# Patient Record
Sex: MALE | Race: WHITE | NOT HISPANIC OR LATINO | Employment: FULL TIME | URBAN - METROPOLITAN AREA
[De-identification: names, ages, dates, MRNs, and addresses within clinical notes are randomized per-mention and may not be internally consistent; named-entity substitution may affect disease eponyms.]

---

## 2018-04-05 ENCOUNTER — TRANSFERRED RECORDS (OUTPATIENT)
Dept: HEALTH INFORMATION MANAGEMENT | Facility: CLINIC | Age: 53
End: 2018-04-05

## 2018-05-18 ENCOUNTER — TRANSFERRED RECORDS (OUTPATIENT)
Dept: HEALTH INFORMATION MANAGEMENT | Facility: CLINIC | Age: 53
End: 2018-05-18

## 2018-05-23 ENCOUNTER — TELEPHONE (OUTPATIENT)
Dept: GASTROENTEROLOGY | Facility: CLINIC | Age: 53
End: 2018-05-23

## 2018-05-23 DIAGNOSIS — K22.70 BARRETT'S ESOPHAGUS: Primary | ICD-10-CM

## 2018-05-23 NOTE — TELEPHONE ENCOUNTER
[facility] Intake form:    Referring/Requesting provider and Health care System: Dr. Deondre Dodd, [facility]      Clinic contact - Name, Phone and Fax number: Merlene DIAMOND @ [phone], ext [phone]    Requested provider (if specified): Dr. Avery    Has patient been evaluated in clinic or had a procedure [facility] provider in the last 5 years: No      Indication/Diagnosis for consultation: Dobson's w/Dysplasia and EGD with RFA    Is diagnosis on list of approved diagnosis: Yes     Has patient been evaluated by another Gastroenterologist? Yes, if yes, who? Dr. Dodd  Imaging completed:     CT scan     No   MRI         No      Procedures:     Upper Endoscopy/EGD   Yes - 5/18/18       Endoscopic Ultrasound/EUS No    ERCP      No    Colonoscopy    No      Are images able/being pushed to our system?Yes        Is patient aware of request for clinc consultation and ok to be contacted to schedule?Yes      Inform referring clinic of the following and provided fax number to: [facility] - [phone]   READ TO REFERRING CLINIC:  Due to high demands for our services our clinic requires all records including imaging studies be mailed and faxed to our facility prior to scheduling. Records should be faxed within 24-72 hours of referral if possible and images should be pushed to our PACS system or received by mail within 72 hours of referral. Our office will NOT call to follow up or request records.  Our clinic will not be able to process, schedule or contact patient without records and Images for MD review process. Once records have been reviewed and recommendation/orders have been made the patient will be contacted to schedule. If records have not been received within 2 weeks of initial referral call, referring office will be notified by letter and referral will be closed. If an appointment is unable to be offered at this time we will inform the referring office and patient via letter.

## 2018-05-24 NOTE — TELEPHONE ENCOUNTER
[facility] intake     Referral Received: 5/24/2018 5/24/2018: film called to grab images     Hard Copy chart created/records received:   5/24/2018: No hard copy records received at this time.   - called referring office, spoke to Merlene FELIZ- they will mail the slides to us so we can have them read by our pathologists.                          Clinical History (per RN review of records provided):     MD review date, recommendations/orders:

## 2018-06-12 NOTE — TELEPHONE ENCOUNTER
Lets have the slides read to confirm dysplasia and if confirmed and the patient willing I would be happy to see him in clinic to discuss the disease and management; we can upfront book EGD with RFA soon thereafter.    Abilio Avery MD PhD  Director of Endoscopy  Associate Professor of Medicine, Surgery and Pediatrics  Interventional and Therapeutic Endoscopy    [facility]  Division of Gastroenterology and Hepatology  [facility] [address], [place] [address]    New Consultations  [phone]  Procedure Scheduling [phone]  Clinical Nurse Coordinator [phone]  Clinical Fax   [phone]  Administrative   [phone]  Administrative Fax  [phone]

## 2018-06-12 NOTE — TELEPHONE ENCOUNTER
[facility] Call Center    Phone Message    May a detailed message be left on voicemail: yes    Reason for Call: Other: Merlene from [facility] called wondering status update of patients EGD w/ RFA. They are wondering if the slides have been received and if anything else is needed. Please contact her katerink as soon as possible at [phone].      Action Taken: Message routed to:  [facility] ([facility]): [facility]

## 2018-06-14 NOTE — TELEPHONE ENCOUNTER
Health Call Center    Phone Message    May a detailed message be left on voicemail: yes    Reason for Call: Other: Kitty RN with [facility] called to check on this patients status. Can this patient be scheduled?      Action Taken:   [facility]

## 2018-06-14 NOTE — TELEPHONE ENCOUNTER
Sent one slide for pathology consult for confirmation of diagnosis.   Scanned pathology report into EPIC.     Called referring office and advised of Dr. Avery's plan for pathology to get reviewed by our pathologists. Once pathology back- we will determine next steps.    They asked if we would call patient and update.     Message left for patient to call back so we can update him.     Salena MANDEL RN Coordinator  Dr. Washburn, Dr. Avery & Dr. Nazario   [facility]  [phone]

## 2018-06-15 PROCEDURE — 00000346 ZZHCL STATISTIC REVIEW OUTSIDE SLIDES TC 88321: Performed by: INTERNAL MEDICINE

## 2018-06-15 NOTE — TELEPHONE ENCOUNTER
[facility] Call Center    Phone Message    May a detailed message be left on voicemail: yes    Reason for Call: Other: Patient returning a call to [facility] from 6/14/18. Please call patient on his cell phone at [phone].     Action Taken: Message routed to:  [facility] ([facility]): Aaron/Pepito

## 2018-06-18 ENCOUNTER — CARE COORDINATION (OUTPATIENT)
Dept: GASTROENTEROLOGY | Facility: CLINIC | Age: 53
End: 2018-06-18

## 2018-06-18 LAB — COPATH REPORT: NORMAL

## 2018-06-18 NOTE — PROGRESS NOTES
"Keo called again and wanted to know the status of his referral. He is advised that we are waiting on the pathology over-read and then will have plan for him based on pathology.   He states that it is taking too long and he checked with [facility] but they are booked out till August. He states his \"doctor thinks this should be taken care of soon because he has CLL.\"  Advised that we are waiting on pathology results and then will be able to determine plan for him, pathology results can take up to a week.     Advised the path slides are the only records received for his referral and nothing was held up for this referral.   He then asked how would be know if he has cancer now since it has been a month since the referral was sent to us.   Advised there is no way for us to know that at this point. He then ended call while telling this RN that he cannot go on living this way with throat pain and \"cutting his hands\" because he is \"in a fog\" all the time.  Advised he will need to go back to his referring office for advice of symptoms until we have a plan for him.     Called and left message for Merlene at referring office- returned her call advised of the above. Also asked to her to send any records pertaining to his diagnosis to us for review as well but the slides were the main thing we needed to get his referral processed. Clinic contact left for her to call back.     Salena MANDEL RN Coordinator  Dr. Washburn, Dr. Avery & Dr. Nazario   [facility]  [phone]       "

## 2018-06-19 NOTE — PROGRESS NOTES
6 Pages of medical records received.   Pathology Report included.   Handed over to RNCC (Salena PIERRE.     06/19/2018 @ 10 A

## 2018-06-20 ENCOUNTER — CARE COORDINATION (OUTPATIENT)
Dept: GASTROENTEROLOGY | Facility: CLINIC | Age: 53
End: 2018-06-20

## 2018-06-20 DIAGNOSIS — K22.710 BARRETT'S ESOPHAGUS WITH LOW GRADE DYSPLASIA: Primary | ICD-10-CM

## 2018-06-20 NOTE — PROGRESS NOTES
Path final back and confirms low grade dysplasia.     Per Dr. Avery:  1. Clinic   2. EGD with RFA     Order place and message sent to scheduling.   - voicemail asking him to call back to discuss plan and scheduling.    Salena MANDEL RN Coordinator  Dr. Washburn, Dr. Avery & Dr. Nazario   [facility]  [phone]

## 2018-06-21 ENCOUNTER — OFFICE VISIT (OUTPATIENT)
Dept: GASTROENTEROLOGY | Facility: CLINIC | Age: 53
End: 2018-06-21
Payer: COMMERCIAL

## 2018-06-21 VITALS
HEIGHT: 74 IN | TEMPERATURE: 98.8 F | OXYGEN SATURATION: 97 % | BODY MASS INDEX: 34.52 KG/M2 | WEIGHT: 269 LBS | DIASTOLIC BLOOD PRESSURE: 77 MMHG | SYSTOLIC BLOOD PRESSURE: 124 MMHG | HEART RATE: 62 BPM

## 2018-06-21 DIAGNOSIS — K22.710 BARRETT'S ESOPHAGUS WITH LOW GRADE DYSPLASIA: Primary | ICD-10-CM

## 2018-06-21 RX ORDER — DIPHENOXYLATE HYDROCHLORIDE AND ATROPINE SULFATE 2.5; .025 MG/1; MG/1
TABLET ORAL
COMMUNITY
Start: 2013-09-16

## 2018-06-21 RX ORDER — ACYCLOVIR 400 MG/1
TABLET ORAL DAILY
COMMUNITY
Start: 2017-11-21

## 2018-06-21 RX ORDER — ESOMEPRAZOLE MAGNESIUM 40 MG/1
40 CAPSULE, DELAYED RELEASE ORAL
Qty: 90 CAPSULE | Refills: 3 | Status: SHIPPED | OUTPATIENT
Start: 2018-06-21 | End: 2018-12-13

## 2018-06-21 RX ORDER — PANTOPRAZOLE SODIUM 40 MG/1
TABLET, DELAYED RELEASE ORAL
Refills: 11 | COMMUNITY
Start: 2018-05-18 | End: 2018-12-13

## 2018-06-21 ASSESSMENT — PAIN SCALES - GENERAL: PAINLEVEL: MODERATE PAIN (5)

## 2018-06-21 NOTE — LETTER
6/21/2018       RE: Solitario Lucia  [address] [facility] [address]     Dear Colleague,    Thank you for referring your patient, Solitario Lucia, to the [facility]CREAS AND BILI[facility] at [facility]. Please see a copy of my visit note below.    The[facility] Endoscopy Clinic Visit    CC/Referring Physician:  Deondre Dodd  Question for Consultation:  Barretts with LGD    Assessment and Plan:  Mr Lucia is a 51yo gentleman with BE and LGD srtuggling with ongoing symptoms of reflux despite maximal medical therapy and excellent behavior modification.    We discussed the pathophysiology and natural progression of Barretts esophagus. Plan will be for upper endoscopy with radiofrequncy ablation and repeated until endoscopic resolution. Of note, if a lesion is demonstrated, we will first focus on EMR resection then proceed at a later date with treating the bed of Barretts.    Plan:  1. Continue behavior modification  2. Raise the head of the bed  3. Upper endoscopy with RFA; possible EMR  4. Change to esomperazole 40mg twice a day as we have found improved efficacy; continue the zantac  5. We will organize a pH impedence and manometry study to further evaluate reflux, while on maximal therapy in the near future    RTC as clinical course dictates    Thank you for this consultation.  It was a pleasure to participate in the care of this patient; please contact us with any further questions.  A total of 40 minutes was spent in face to face evaluation with this patient, >50% of which was counseling regarding the above delineated issues.  Abilio Avery MD PhD  Director of Endoscopy  Associate Professor of Medicine, Surgery and Pediatrics  Interventional and Therapeutic Endoscopy    [facility]  Division of Gastroenterology and Hepatology  [facility] [address] [place], [place] [address]    New Consultations  [phone]  Procedure  "Scheduling [phone]  Clinical Nurse Coordinator [phone]  Clinical Fax   [phone]  Administrative   [phone]  Administrative Fax  [phone]    -------------------------------------------------------------------------------------------------------------------  History of Presentation:   Mr Lucia is a 52 year old gentleman with Barretts esophagus and low grade dysplasia confirmed on repeat evaluation of pathology. He was CLL last November and his counts are being observed. He was found on upper endoscopy in the setting of reflux like symptoms to have Barretts without a suspicious lesion. Random biopsies did demonstrate low grade and he now presents for Barretts and consideration of therapy.    He has had reflux for years and had an endoscopy years back, perhaps finding Barretts then (unclear). He started prilosec at 40mg back then with partial relief of symptoms. Now he is pantoprazole 40mg twice a day as well as zantac 300mg daily, again with incomplete relief. It is unclear if he has a hiatal hernia.    Breakthrough symptoms throughout the day but worse at night while sleeping. He gets relief with numbin spray. He has changed his diet to be three times daily, smaller meals. He does not eat within 3h of sleeping. He does drink once a week. By his account he truly seems to be doing reasonable so far as diet modifications. He sleeps on his side.    He has not smoked cigarettes for a long time.    Review of systems:  A twelve point review was performed and was otherwise negative beyond what is mentioned in the history above.    Pertinent past medical history:  Meningitis x4  CLL  \"Wood tick\" disease    Previous surgeries:  Hernia repair  Right knee  Right hip  Right shoulder    Current mediations:  Current Outpatient Prescriptions   Medication     acyclovir (ZOVIRAX) 400 MG tablet     FLUoxetine (PROZAC) 20 MG capsule     Ibuprofen (ADVIL PO)     Multiple Vitamin (MULTI-VITAMINS) TABS     OMEGA-3 " FATTY ACIDS-VITAMIN E PO     pantoprazole (PROTONIX) 40 MG EC tablet     ranitidine (ZANTAC) 300 MG tablet     No current facility-administered medications for this visit.      Medications reviewed with patient today, see Medication List/Assessment for details.  No other NSAID/anticoagulation reported by patient.  No other OTC/herbal/supplements reported by patient.    Social history:  Social History     Social History     Marital status:      Spouse name: N/A     Number of children: N/A     Years of education: N/A     Occupational History     Not on file.     Social History Main Topics     Smoking status: Former Smoker     Quit date: 1/1/2001     Smokeless tobacco: Former User     Alcohol use Not on file     Drug use: Not on file     Sexual activity: Not on file     Other Topics Concern     Not on file     Social History Narrative       Family history:  Pancreatic cancer in brother (69)  Sister with breast  No other colon/panc/other GI CA, no other HNPCC-related Bharati.  No IBD/celiac, no AI/liver disease.    PHYSICAL EXAMINATION:  Vitals reviewed, AFVSS   Wt   Wt Readings from Last 2 Encounters:   06/21/18 122 kg (269 lb)   02/12/15 113.4 kg (250 lb)      Gen: nontoxic, aaox3, cooperative, pleasant, not dyspneic/diaphoretic  HEENT: neck supple, normal op w/o ulcer/exudate, anicteric  Resp/CV unremarkable without significant finding  Abd: benign, soft, nondistended, intact bs, no hepatosplenomegaly, nontender, no peritoneal signs  Ext: no c/c/e  Skin: warm, perfused, no jaundice  Neuro: grossly intact    Pertinent outside studies:    Again, thank you for allowing me to participate in the care of your patient.      Sincerely,    Abilio Avery MD

## 2018-06-21 NOTE — MR AVS SNAPSHOT
After Visit Summary   2018    Solitario Lucia    MRN: [id number]           Patient Information     Date Of Birth          1965        Visit Information        Provider Department      2018 8:15 AM Abilio Avery MD [facility] Pancreas and Biliary        Today's Diagnoses     Dobson's esophagus with low grade dysplasia    -  1       Follow-ups after your visit        Your next 10 appointments already scheduled     2018   Procedure with Abilio Avery MD   [facility], [place], Same Day Surgery (--)    [address]  [place] [id number] [phone]              Future tests that were ordered for you today     Open Future Orders        Priority Expected Expires Ordered    UPPER GI ENDOSCOPY Routine  2018            Who to contact     Please call your clinic at [phone] to:    Ask questions about your health    Make or cancel appointments    Discuss your medicines    Learn about your test results    Speak to your doctor            Additional Information About Your Visit        MyChart Information     [employer]) is an electronic gateway that provides easy, online access to your medical records. With [employer]), you can request a clinic appointment, read your test results, renew a prescription or communicate with your care team.     To sign up for [employer] visit the website at [email or web address]   You will be asked to enter the access code listed below, as well as some personal information. Please follow the directions to create your username and password.     Your access code is: [email or web address]  Expires: 2018  6:31 AM     Your access code will  in 90 days. If you need help or a new code, please contact your [facility] or call [phone] for assistance.        Care EveryWhere ID     This is your Care EveryWhere ID. This could be used by other organizations to access your [place] medical records  [id number]    "     Your Vitals Were     Pulse Temperature Height Pulse Oximetry BMI (Body Mass Index)       62 98.8  F (37.1  C) (Oral) 1.867 m (6' 1.5\") 97% 35.01 kg/m2        Blood Pressure from Last 3 Encounters:   06/21/18 124/77   02/12/15 141/81   01/29/15 145/84    Weight from Last 3 Encounters:   06/21/18 122 kg (269 lb)   02/12/15 113.4 kg (250 lb)   01/29/15 115.7 kg (255 lb)              Today, you had the following     No orders found for display         Today's Medication Changes          These changes are accurate as of 6/21/18  9:26 AM.  If you have any questions, ask your nurse or doctor.               Start taking these medicines.        Dose/Directions    esomeprazole 40 MG CR capsule   Commonly known as:  nexIUM   Used for:  Dobson's esophagus with low grade dysplasia   Started by:  Abilio Avery MD        Dose:  40 mg   Take 1 capsule (40 mg) by mouth two times daily Take 30-60 minutes before eating.   Quantity:  90 capsule   Refills:  3            Where to get your medicines      These medications were sent to [facility]  [place] - [address]  [address] , [facility] [address]     Phone:  [phone]     esomeprazole 40 MG CR capsule                Primary Care Provider Office Phone # Fax #    Clemente Guzmán [phone]       [address] [address]        Equal Access to Services     [facility] AH: Hadii omid ku hadasho Soomaali, waaxda luqadaha, qaybta kaalmada adeegyada, waxay evelyn dacosta adeandrade cuadra . So [facility] [phone].    ATENCIÓN: Si habla español, tiene a henderson disposición servicios gratuitos de asistencia lingüística. Llame al [phone].    We comply with applicable federal civil rights laws and [place] laws. We do not discriminate on the basis of race, color, national origin, age, disability, sex, sexual orientation, or gender identity.            Thank you!     Thank you for choosing [facility] PANCREAS AND " BILIARY  for your care. Our goal is always to provide you with excellent care. Hearing back from our patients is one way we can continue to improve our services. Please take a few minutes to complete the written survey that you may receive in the mail after your visit with us. Thank you!             Your Updated Medication List - Protect others around you: Learn how to safely use, store and throw away your medicines at www.disposemymeds.org.          This list is accurate as of 6/21/18  9:26 AM.  Always use your most recent med list.                   Brand Name Dispense Instructions for use Diagnosis    acyclovir 400 MG tablet    ZOVIRAX          ADVIL PO      Take by mouth every 4 hours as needed for moderate pain        esomeprazole 40 MG CR capsule    nexIUM    90 capsule    Take 1 capsule (40 mg) by mouth two times daily Take 30-60 minutes before eating.    Dobson's esophagus with low grade dysplasia       FLUoxetine 20 MG capsule    PROzac     Take 20 mg by mouth        MULTI-VITAMINS Tabs           OMEGA-3 FATTY ACIDS-VITAMIN E PO      Take 1 capsule by mouth        pantoprazole 40 MG EC tablet    PROTONIX     TAKE 1 TABLET BY MOUTH 2 TIMES EACH DAY        ranitidine 300 MG tablet    ZANTAC

## 2018-06-21 NOTE — PROGRESS NOTES
Therapeutic Endoscopy Clinic Visit    CC/Referring Physician:  Deondre Dodd  Question for Consultation:  Barretts with LGD    Assessment and Plan:  Mr Lucia is a 51yo gentleman with BE and LGD srtuggling with ongoing symptoms of reflux despite maximal medical therapy and excellent behavior modification.    We discussed the pathophysiology and natural progression of Barretts esophagus. Plan will be for upper endoscopy with radiofrequncy ablation and repeated until endoscopic resolution. Of note, if a lesion is demonstrated, we will first focus on EMR resection then proceed at a later date with treating the bed of Barretts.    Plan:  1. Continue behavior modification  2. Raise the head of the bed  3. Upper endoscopy with RFA; possible EMR  4. Change to esomperazole 40mg twice a day as we have found improved efficacy; continue the zantac  5. We will organize a pH impedence and manometry study to further evaluate reflux, while on maximal therapy in the near future    RTC as clinical course dictates    Thank you for this consultation.  It was a pleasure to participate in the care of this patient; please contact us with any further questions.  A total of 40 minutes was spent in face to face evaluation with this patient, >50% of which was counseling regarding the above delineated issues.    Abilio Avery MD PhD  Director of Endoscopy  Associate Professor of Medicine, Surgery and Pediatrics  Interventional and Therapeutic Endoscopy    [facility]  Division of Gastroenterology and Hepatology  [facility] [address] [address]    New Consultations  [phone]  Procedure Scheduling [phone]  Clinical Nurse Coordinator [phone]  Clinical Fax   [phone]  Administrative   [phone]  Administrative Fax  [phone]    -------------------------------------------------------------------------------------------------------------------  History of  "Presentation:   Mr Lucia is a 52 year old gentleman with Barretts esophagus and low grade dysplasia confirmed on repeat evaluation of pathology. He was CLL last November and his counts are being observed. He was found on upper endoscopy in the setting of reflux like symptoms to have Barretts without a suspicious lesion. Random biopsies did demonstrate low grade and he now presents for Barretts and consideration of therapy.    He has had reflux for years and had an endoscopy years back, perhaps finding Barretts then (unclear). He started prilosec at 40mg back then with partial relief of symptoms. Now he is pantoprazole 40mg twice a day as well as zantac 300mg daily, again with incomplete relief. It is unclear if he has a hiatal hernia.    Breakthrough symptoms throughout the day but worse at night while sleeping. He gets relief with numbin spray. He has changed his diet to be three times daily, smaller meals. He does not eat within 3h of sleeping. He does drink once a week. By his account he truly seems to be doing reasonable so far as diet modifications. He sleeps on his side.    He has not smoked cigarettes for a long time.    Review of systems:  A twelve point review was performed and was otherwise negative beyond what is mentioned in the history above.    Pertinent past medical history:  Meningitis x4  CLL  \"Wood tick\" disease    Previous surgeries:  Hernia repair  Right knee  Right hip  Right shoulder    Current mediations:  Current Outpatient Prescriptions   Medication     acyclovir (ZOVIRAX) 400 MG tablet     FLUoxetine (PROZAC) 20 MG capsule     Ibuprofen (ADVIL PO)     Multiple Vitamin (MULTI-VITAMINS) TABS     OMEGA-3 FATTY ACIDS-VITAMIN E PO     pantoprazole (PROTONIX) 40 MG EC tablet     ranitidine (ZANTAC) 300 MG tablet     No current facility-administered medications for this visit.      Medications reviewed with patient today, see Medication List/Assessment for details.  No other NSAID/anticoagulation " reported by patient.  No other OTC/herbal/supplements reported by patient.    Social history:  Social History     Social History     Marital status:      Spouse name: N/A     Number of children: N/A     Years of education: N/A     Occupational History     Not on file.     Social History Main Topics     Smoking status: Former Smoker     Quit date: 1/1/2001     Smokeless tobacco: Former User     Alcohol use Not on file     Drug use: Not on file     Sexual activity: Not on file     Other Topics Concern     Not on file     Social History Narrative       Family history:  Pancreatic cancer in brother (69)  Sister with breast  No other colon/panc/other GI CA, no other HNPCC-related Bharati.  No IBD/celiac, no AI/liver disease.    PHYSICAL EXAMINATION:  Vitals reviewed, AFVSS   Wt   Wt Readings from Last 2 Encounters:   06/21/18 122 kg (269 lb)   02/12/15 113.4 kg (250 lb)      Gen: nontoxic, aaox3, cooperative, pleasant, not dyspneic/diaphoretic  HEENT: neck supple, normal op w/o ulcer/exudate, anicteric  Resp/CV unremarkable without significant finding  Abd: benign, soft, nondistended, intact bs, no hepatosplenomegaly, nontender, no peritoneal signs  Ext: no c/c/e  Skin: warm, perfused, no jaundice  Neuro: grossly intact    Pertinent outside studies:

## 2018-06-21 NOTE — NURSING NOTE
"  Chief Complaint   Patient presents with     Consult     New consult needs clinic and EGD     Vitals:    06/21/18 0803   BP: 124/77   Pulse: 62   Temp: 98.8  F (37.1  C)   TempSrc: Oral   SpO2: 97%   Weight: 269 lb   Height: 6' 1.5\"     Body mass index is 35.01 kg/(m^2).  Shannan Potts CMA    "

## 2018-06-21 NOTE — NURSING NOTE
Reviewed today's consult and answered patient's questions.  Patient verbalized understanding and agreed to call me with any questions / concerns in the future and confirmed having our contact information.  Please see patient instructions below.       Patient called and is amenable to plan as noted and aware of the following:  Procedure explained to patient.  This is a non-urgent procedure.   Can expect a call for date and time for procedure.   Will need a , someone to stay with them for 24 hours and stay in town for 24 hours (within 45 min of Hospital) post procedure, rational explained.   Will get pre-op physical done locally and will fax a copy to us along with bringing a hard copy with them. Fax number given. [phone]    Blood thinner -  none  ASA - yes, 81 mg - advised to stop 7 day prior to procedure  Diabetic - N/A  A pre-op nurse will call 1-2 days prior to the procedure.   Is advised to be NPO/solid food at midnight before the procedure in the event the procedure time is moved up. Ok to drink clear liquids up to 4 hours prior to procedure.     Advised post procedure to start with clear liquids and advance diet slowly as tolerated.  It is normal to have a sore throat after the procedure.   May also have some muscle tenderness from positioning on the table.     Aware RN will call within 2-3 days post procedure.    Verbalized understanding of all instructions. All questions answered.   Contact information verified for future questions/concerns.

## 2018-07-02 ENCOUNTER — SURGERY (OUTPATIENT)
Age: 53
End: 2018-07-02

## 2018-07-02 ENCOUNTER — HOSPITAL ENCOUNTER (OUTPATIENT)
Facility: CLINIC | Age: 53
Discharge: HOME | End: 2018-07-02
Attending: INTERNAL MEDICINE | Admitting: INTERNAL MEDICINE
Payer: COMMERCIAL

## 2018-07-02 ENCOUNTER — ANESTHESIA (OUTPATIENT)
Dept: SURGERY | Facility: CLINIC | Age: 53
End: 2018-07-02
Payer: COMMERCIAL

## 2018-07-02 ENCOUNTER — ANESTHESIA EVENT (OUTPATIENT)
Dept: SURGERY | Facility: CLINIC | Age: 53
End: 2018-07-02
Payer: COMMERCIAL

## 2018-07-02 VITALS
RESPIRATION RATE: 16 BRPM | SYSTOLIC BLOOD PRESSURE: 120 MMHG | TEMPERATURE: 98.7 F | HEIGHT: 74 IN | DIASTOLIC BLOOD PRESSURE: 88 MMHG | BODY MASS INDEX: 33.98 KG/M2 | HEART RATE: 62 BPM | OXYGEN SATURATION: 98 % | WEIGHT: 264.77 LBS

## 2018-07-02 LAB
GLUCOSE BLDC GLUCOMTR-MCNC: 95 MG/DL (ref 70–99)
UPPER GI ENDOSCOPY: NORMAL

## 2018-07-02 PROCEDURE — 27210794 ZZH OR GENERAL SUPPLY STERILE: Performed by: INTERNAL MEDICINE

## 2018-07-02 PROCEDURE — C9399 UNCLASSIFIED DRUGS OR BIOLOG: HCPCS | Performed by: NURSE ANESTHETIST, CERTIFIED REGISTERED

## 2018-07-02 PROCEDURE — 25000565 ZZH ISOFLURANE, EA 15 MIN: Performed by: INTERNAL MEDICINE

## 2018-07-02 PROCEDURE — 40000170 ZZH STATISTIC PRE-PROCEDURE ASSESSMENT II: Performed by: INTERNAL MEDICINE

## 2018-07-02 PROCEDURE — 82962 GLUCOSE BLOOD TEST: CPT

## 2018-07-02 PROCEDURE — 25000128 H RX IP 250 OP 636: Performed by: NURSE ANESTHETIST, CERTIFIED REGISTERED

## 2018-07-02 PROCEDURE — 36000064 ZZH SURGERY LEVEL 4 EA 15 ADDTL MIN - UMMC: Performed by: INTERNAL MEDICINE

## 2018-07-02 PROCEDURE — 37000008 ZZH ANESTHESIA TECHNICAL FEE, 1ST 30 MIN: Performed by: INTERNAL MEDICINE

## 2018-07-02 PROCEDURE — 25000125 ZZHC RX 250: Performed by: INTERNAL MEDICINE

## 2018-07-02 PROCEDURE — 25000128 H RX IP 250 OP 636: Performed by: ANESTHESIOLOGY

## 2018-07-02 PROCEDURE — 36000062 ZZH SURGERY LEVEL 4 1ST 30 MIN - UMMC: Performed by: INTERNAL MEDICINE

## 2018-07-02 PROCEDURE — 71000027 ZZH RECOVERY PHASE 2 EACH 15 MINS: Performed by: INTERNAL MEDICINE

## 2018-07-02 PROCEDURE — C1888 ENDOVAS NON-CARDIAC ABL CATH: HCPCS | Performed by: INTERNAL MEDICINE

## 2018-07-02 PROCEDURE — 71000014 ZZH RECOVERY PHASE 1 LEVEL 2 FIRST HR: Performed by: INTERNAL MEDICINE

## 2018-07-02 PROCEDURE — 25000125 ZZHC RX 250: Performed by: NURSE ANESTHETIST, CERTIFIED REGISTERED

## 2018-07-02 PROCEDURE — 37000009 ZZH ANESTHESIA TECHNICAL FEE, EACH ADDTL 15 MIN: Performed by: INTERNAL MEDICINE

## 2018-07-02 RX ORDER — ONDANSETRON 4 MG/1
4 TABLET, ORALLY DISINTEGRATING ORAL EVERY 6 HOURS PRN
Status: DISCONTINUED | OUTPATIENT
Start: 2018-07-02 | End: 2018-07-02 | Stop reason: HOSPADM

## 2018-07-02 RX ORDER — LIDOCAINE HYDROCHLORIDE 20 MG/ML
INJECTION, SOLUTION INFILTRATION; PERINEURAL PRN
Status: DISCONTINUED | OUTPATIENT
Start: 2018-07-02 | End: 2018-07-02

## 2018-07-02 RX ORDER — FENTANYL CITRATE 50 UG/ML
25-50 INJECTION, SOLUTION INTRAMUSCULAR; INTRAVENOUS
Status: DISCONTINUED | OUTPATIENT
Start: 2018-07-02 | End: 2018-07-02 | Stop reason: HOSPADM

## 2018-07-02 RX ORDER — PROPOFOL 10 MG/ML
INJECTION, EMULSION INTRAVENOUS PRN
Status: DISCONTINUED | OUTPATIENT
Start: 2018-07-02 | End: 2018-07-02

## 2018-07-02 RX ORDER — GLYCOPYRROLATE 0.2 MG/ML
INJECTION, SOLUTION INTRAMUSCULAR; INTRAVENOUS PRN
Status: DISCONTINUED | OUTPATIENT
Start: 2018-07-02 | End: 2018-07-02

## 2018-07-02 RX ORDER — LIDOCAINE 40 MG/G
CREAM TOPICAL
Status: DISCONTINUED | OUTPATIENT
Start: 2018-07-02 | End: 2018-07-02 | Stop reason: HOSPADM

## 2018-07-02 RX ORDER — NALOXONE HYDROCHLORIDE 0.4 MG/ML
.1-.4 INJECTION, SOLUTION INTRAMUSCULAR; INTRAVENOUS; SUBCUTANEOUS
Status: DISCONTINUED | OUTPATIENT
Start: 2018-07-02 | End: 2018-07-02 | Stop reason: HOSPADM

## 2018-07-02 RX ORDER — FENTANYL CITRATE 50 UG/ML
INJECTION, SOLUTION INTRAMUSCULAR; INTRAVENOUS PRN
Status: DISCONTINUED | OUTPATIENT
Start: 2018-07-02 | End: 2018-07-02

## 2018-07-02 RX ORDER — MEPERIDINE HYDROCHLORIDE 50 MG/ML
12.5 INJECTION INTRAMUSCULAR; INTRAVENOUS; SUBCUTANEOUS
Status: DISCONTINUED | OUTPATIENT
Start: 2018-07-02 | End: 2018-07-02 | Stop reason: HOSPADM

## 2018-07-02 RX ORDER — ONDANSETRON 2 MG/ML
4 INJECTION INTRAMUSCULAR; INTRAVENOUS EVERY 6 HOURS PRN
Status: DISCONTINUED | OUTPATIENT
Start: 2018-07-02 | End: 2018-07-02 | Stop reason: HOSPADM

## 2018-07-02 RX ORDER — ONDANSETRON 2 MG/ML
4 INJECTION INTRAMUSCULAR; INTRAVENOUS EVERY 30 MIN PRN
Status: DISCONTINUED | OUTPATIENT
Start: 2018-07-02 | End: 2018-07-02 | Stop reason: HOSPADM

## 2018-07-02 RX ORDER — SODIUM CHLORIDE, SODIUM LACTATE, POTASSIUM CHLORIDE, CALCIUM CHLORIDE 600; 310; 30; 20 MG/100ML; MG/100ML; MG/100ML; MG/100ML
INJECTION, SOLUTION INTRAVENOUS CONTINUOUS PRN
Status: DISCONTINUED | OUTPATIENT
Start: 2018-07-02 | End: 2018-07-02

## 2018-07-02 RX ORDER — DEXAMETHASONE SODIUM PHOSPHATE 10 MG/ML
INJECTION, SOLUTION INTRAMUSCULAR; INTRAVENOUS PRN
Status: DISCONTINUED | OUTPATIENT
Start: 2018-07-02 | End: 2018-07-02

## 2018-07-02 RX ORDER — ONDANSETRON 2 MG/ML
4 INJECTION INTRAMUSCULAR; INTRAVENOUS
Status: DISCONTINUED | OUTPATIENT
Start: 2018-07-02 | End: 2018-07-02 | Stop reason: HOSPADM

## 2018-07-02 RX ORDER — FLUMAZENIL 0.1 MG/ML
0.2 INJECTION, SOLUTION INTRAVENOUS
Status: DISCONTINUED | OUTPATIENT
Start: 2018-07-02 | End: 2018-07-02 | Stop reason: HOSPADM

## 2018-07-02 RX ORDER — ONDANSETRON 4 MG/1
4 TABLET, ORALLY DISINTEGRATING ORAL EVERY 30 MIN PRN
Status: DISCONTINUED | OUTPATIENT
Start: 2018-07-02 | End: 2018-07-02 | Stop reason: HOSPADM

## 2018-07-02 RX ORDER — SODIUM CHLORIDE, SODIUM LACTATE, POTASSIUM CHLORIDE, CALCIUM CHLORIDE 600; 310; 30; 20 MG/100ML; MG/100ML; MG/100ML; MG/100ML
INJECTION, SOLUTION INTRAVENOUS CONTINUOUS
Status: DISCONTINUED | OUTPATIENT
Start: 2018-07-02 | End: 2018-07-02 | Stop reason: HOSPADM

## 2018-07-02 RX ORDER — HYDROMORPHONE HYDROCHLORIDE 1 MG/ML
.3-.5 INJECTION, SOLUTION INTRAMUSCULAR; INTRAVENOUS; SUBCUTANEOUS EVERY 10 MIN PRN
Status: DISCONTINUED | OUTPATIENT
Start: 2018-07-02 | End: 2018-07-02 | Stop reason: HOSPADM

## 2018-07-02 RX ADMIN — PROPOFOL 50 MG: 10 INJECTION, EMULSION INTRAVENOUS at 11:20

## 2018-07-02 RX ADMIN — FENTANYL CITRATE 100 MCG: 50 INJECTION, SOLUTION INTRAMUSCULAR; INTRAVENOUS at 11:08

## 2018-07-02 RX ADMIN — SUGAMMADEX 200 MG: 100 INJECTION, SOLUTION INTRAVENOUS at 11:38

## 2018-07-02 RX ADMIN — Medication 0.5 MG: at 12:16

## 2018-07-02 RX ADMIN — MIDAZOLAM 2 MG: 1 INJECTION INTRAMUSCULAR; INTRAVENOUS at 10:58

## 2018-07-02 RX ADMIN — SODIUM CHLORIDE, POTASSIUM CHLORIDE, SODIUM LACTATE AND CALCIUM CHLORIDE: 600; 310; 30; 20 INJECTION, SOLUTION INTRAVENOUS at 10:58

## 2018-07-02 RX ADMIN — PROPOFOL 150 MG: 10 INJECTION, EMULSION INTRAVENOUS at 11:08

## 2018-07-02 RX ADMIN — Medication 100 MG: at 11:08

## 2018-07-02 RX ADMIN — ROCURONIUM BROMIDE 30 MG: 10 INJECTION INTRAVENOUS at 11:21

## 2018-07-02 RX ADMIN — LIDOCAINE HYDROCHLORIDE 100 MG: 20 INJECTION, SOLUTION INFILTRATION; PERINEURAL at 11:08

## 2018-07-02 RX ADMIN — GLYCOPYRROLATE 0.2 MG: 0.2 INJECTION, SOLUTION INTRAMUSCULAR; INTRAVENOUS at 11:31

## 2018-07-02 RX ADMIN — SIMETHICONE 4 ML: 63.3; 3.7 SOLUTION/ DROPS ORAL at 11:28

## 2018-07-02 RX ADMIN — DEXAMETHASONE SODIUM PHOSPHATE 6 MG: 10 INJECTION, SOLUTION INTRAMUSCULAR; INTRAVENOUS at 11:14

## 2018-07-02 ASSESSMENT — PAIN DESCRIPTION - DESCRIPTORS: DESCRIPTORS: BURNING

## 2018-07-02 NOTE — ANESTHESIA PREPROCEDURE EVALUATION
Anesthesia Evaluation     . Pt has had prior anesthetic. Type: General           ROS/MED HX    ENT/Pulmonary:  - neg pulmonary ROS     Neurologic:  - neg neurologic ROS     Cardiovascular:  - neg cardiovascular ROS       METS/Exercise Tolerance:     Hematologic:  - neg hematologic  ROS       Musculoskeletal:  - neg musculoskeletal ROS       GI/Hepatic:     (+) GERD       Renal/Genitourinary:  - ROS Renal section negative       Endo:  - neg endo ROS       Psychiatric:  - neg psychiatric ROS       Infectious Disease:  - neg infectious disease ROS       Malignancy:      - no malignancy   Other:    - neg other ROS                 Physical Exam  Normal systems: cardiovascular, pulmonary and dental    Airway   Mallampati: II  TM distance: >3 FB  Neck ROM: full    Dental     Cardiovascular       Pulmonary                     Anesthesia Plan      History & Physical Review      ASA Status:  2 .    NPO Status:  > 8 hours    Plan for General and RSI with Intravenous induction. Maintenance will be Balanced.    PONV prophylaxis:  Ondansetron (or other 5HT-3)  Additional equipment: Videolaryngoscope      Postoperative Care  Postoperative pain management:  IV analgesics.      Consents  Anesthetic plan, risks, benefits and alternatives discussed with:  Patient.  Use of blood products discussed: No .   .                          .

## 2018-07-02 NOTE — OP NOTE
Upper GI Endoscopy 07/02/2018 11:02 AM [facility], [address] [place] [address] [phone]     Endoscopy Department   _______________________________________________________________________________   Patient Name: Solitario Lucia           Procedure Date: 7/2/2018 11:02 AM   MRN: [id number]                       Account Number: [id number]   YOB: 1965             Admit Type: Outpatient   Age: 52                               Room: OR   Gender: Male                          Note Status: Finalized   Attending MD: Abilio Avery MD  Pause for the Cause: pause for cause    completed   Total Sedation Time:                     _______________________________________________________________________________       Procedure:           Upper GI endoscopy   Indications:         Dobson's low grade dysplasia   Providers:           Abilio Avery MD   Patient Profile:     Mr Lucia is a 51yo gentleman with ongoing reflux found                        to have Barretts with low grade dysplasia who now                        proceeds to management by upper endoscopy. His CLL is                        not an acute issue at the moment.   Referring MD:        Deondre Dodd MD   Medicines:           General Anesthesia   Complications:       No immediate complications.   _______________________________________________________________________________   Procedure:           Pre-Anesthesia Assessment:                        - Prior to the procedure, a History and Physical was                        performed, and patient medications and allergies were                        reviewed. The patient is competent. The risks and                        benefits of the procedure and the sedation options and                        risks were discussed with the patient. All questions                        were answered and informed consent was obtained. Patient                         identification and proposed procedure were verified by                        the nurse in the pre-procedure area. Mental Status                        Examination: alert and oriented. Airway Examination:                        Mallampati Class II (the uvula but not tonsillar pillars                        visualized). Respiratory Examination: clear to                        auscultation. CV Examination: normal. ASA Grade                        Assessment: II - A patient with mild systemic disease.                        After reviewing the risks and benefits, the patient was                        deemed in satisfactory condition to undergo the                        procedure. The anesthesia plan was to use general                        anesthesia. Immediately prior to administration of                        medications, the patient was re-assessed for adequacy to                        receive sedatives. The heart rate, respiratory rate,                        oxygen saturations, blood pressure, adequacy of                        pulmonary ventilation, and response to care were                        monitored throughout the procedure. The physical status                        of the patient was re-assessed after the procedure.                        After obtaining informed consent, the endoscope was                        passed under direct vision. Throughout the procedure,                        the patient's blood pressure, pulse, and oxygen                        saturations were monitored continuously. The gastroscope                        was introduced through the mouth, and advanced to the                        second part of duodenum. The upper GI endoscopy was                        accomplished without difficulty. The patient tolerated                        the procedure well.                                                                                     Findings:        A complete  foregut white light endoscopy was completed. The proximal and        mid esophagus were unremarkable. The distal esophagus and        gastroesophageal junction were examined with white light and narrow band        imaging (NBI). There were esophageal mucosal changes consistent with        short-segment Dobson's esophagus. These changes involved the mucosa in        the form of an irregular Z-line with small upstream islands (39-38 cm        from the incisors). No other visible abnormalities were present. The        stomach was unremarkable as was the proximal duodenum. Focal        radiofrequency ablation of Dobson's esophagus was performed. With the        endoscope in place, the position and extent of the Dobson's mucosa and        the anatomic landmarks including proximal and distal extent of Dobson's        mucosa, top of gastric folds and crural pinch were noted. The Dobson's        mucosa was irrigated with water. Gastric contents were suctioned. The        radiofrequency channel ablation catheter was introduced through the        endoscope working channel. Dobson's tissue was targeted. The endoscope        with the channel ablation catheter was positioned under direct        visualization so that the catheter was placed in contact with the        surface of the Dobson's mucosa. Energy was applied twice at 12 J/cm2.        Ablation was repeated in a likewise fashion to the entire area of        suspected Dobson's mucosa. The channel ablation catheter was then        removed through the endoscope working channel, and the ablation catheter        was cleaned. The endoscope was left in place. The ablation zone was        cleaned of coagulative debris. The ablation catheter was reinserted into        the endoscope working channel. A second round of ablation was then        performed. Energy was applied twice at 12 J/cm2 to retreat the areas of        Dobson's epithelium that had been treated with the  first series of        ablation. The ablation catheter was removed through the endoscope        working channel. The areas of the esophagus where Dobson's mucosa had        been ablated were examined. Areas of Dobson's esophagus were completely        ablated.                                                                                     Impression:          - C0M1 Barretts esophagus (biopsy proven with low grade                        dysplasia) without concern concomitant lesion was                        treated with radiofrequncy ablation as detailed above                        - Otherwise unremarkable foregut endoscopy   Recommendation:      - Standard outpatient general anesthesia recovery with                        probable discharge home this afternoon                        - Repeat upper endoscopy with repeat therapy or sampling                        (pending findings) in 12 weeks                        - Continue acid suppression and behavior modification as                        discussed in clinic                        - The findings and recommendations were discussed with                        the patient and their family                                                                                       electronically signed by TEJAS Avery

## 2018-07-02 NOTE — IP AVS SNAPSHOT
Same Day Surgery [address] [address]    Phone:  [phone]                                       After Visit Summary   7/2/2018    Solitario Lucia    MRN: [id number]           After Visit Summary Signature Page     I have received my discharge instructions, and my questions have been answered. I have discussed any challenges I see with this plan with the nurse or doctor.    ..........................................................................................................................................  Patient/Patient Representative Signature      ..........................................................................................................................................  Patient Representative Print Name and Relationship to Patient    ..................................................               ................................................  Date                                            Time    ..........................................................................................................................................  Reviewed by Signature/Title    ...................................................              ..............................................  Date                                                            Time

## 2018-07-02 NOTE — IP AVS SNAPSHOT
MRN:[id number]                      After Visit Summary   7/2/2018    Solitario Lucia    MRN: [id number]           Thank you!     Thank you for choosing [facility] for your care. Our goal is always to provide you with excellent care. Hearing back from our patients is one way we can continue to improve our services. Please take a few minutes to complete the written survey that you may receive in the mail after you visit with us. Thank you!        Patient Information     Date Of Birth          1965        About your hospital stay     You were admitted on:  July 2, 2018 You last received care in the:  Same Day Surgery [facility]    You were discharged on:  July 2, 2018       Who to Call     For medical emergencies, please call 911.  For non-urgent questions about your medical care, please call your primary care provider or clinic, [phone]  For questions related to your surgery, please call your surgery clinic        Attending Provider     Provider Abilio Butler MD Gastroenterology       Primary Care Provider Office Phone # Fax #    Clemente Guzmán [phone]      Further instructions from your care team       [facility]  Same-Day Surgery   Adult Discharge Orders & Instructions     For 24 hours after surgery    1. Get plenty of rest.  A responsible adult must stay with you for at least 24 hours after you leave the hospital.   2. Do not drive or use heavy equipment.  If you have weakness or tingling, don't drive or use heavy equipment until this feeling goes away.  3. Do not drink alcohol.  4. Avoid strenuous or risky activities.  Ask for help when climbing stairs.   5. You may feel lightheaded.  IF so, sit for a few minutes before standing.  Have someone help you get up.   6. If you have nausea (feel sick to your stomach): Drink only clear liquids such as apple juice, ginger ale, broth or 7-Up.  Rest may also help.  " Be sure to drink enough fluids.  Move to a regular diet as you feel able.  7. You may have a slight fever. Call the doctor if your fever is over 100 F (37.7 C) (taken under the tongue) or lasts longer than 24 hours.  8. You may have a dry mouth, a sore throat, muscle aches or trouble sleeping.  These should go away after 24 hours.  9. Do not make important or legal decisions.   Call your doctor for any of the followin.  Signs of infection (fever, growing tenderness at the surgery site, a large amount of drainage or bleeding, severe pain, foul-smelling drainage, redness, swelling).    2. It has been over 8 to 10 hours since surgery and you are still not able to urinate (pass water).    3.  Headache for over 24 hours.    To contact a doctor, call Dr Avery at [phone] (clinic) or:      [phone] and ask for the resident on call for GASTROENTEROLOGY (answered 24 hours a day)      Emergency Department:    [facility]: [phone]       (TTY for hearing impaired: [phone])    [facility]: [phone]       (TTY for hearing impaired: [phone])    Pending Results     No orders found from 2018 to 7/3/2018.            Admission Information     Date & Time Provider Department Dept. Phone    2018 Abilio Avery MD Same Day Surgery [facility] [phone]      Your Vitals Were     Blood Pressure Pulse Temperature Respirations Height Weight    101/60 62 98.2  F (36.8  C) (Oral) 14 1.88 m (6' 2.02\") 120.1 kg (264 lb 12.4 oz)    Pulse Oximetry BMI (Body Mass Index)                94% 33.98 kg/m2          [employer] Information     [employer] lets you send messages to your doctor, view your test results, renew your prescriptions, schedule appointments and more. To sign up, go to [email or web address] . Click on \"Log in\" on the left side of the screen, which will take you to the Welcome page. Then click on \"Sign up Now\" on the right side of the page.     You will be asked to " enter the access code listed below, as well as some personal information. Please follow the directions to create your username and password.     Your access code is: [email or web address]  Expires: 2018  6:31 AM     Your access code will  in 90 days. If you need help or a new code, please call your [facility] clinic or [phone].        Care EveryWhere ID     This is your Care EveryWhere ID. This could be used by other organizations to access your [facility] medical records  [id number]        Equal Access to Services     [facility]: Hadii omid veliz Sogary, waely luqadaha, qaybjaqueline kaalmablane manuel, yesenia cuadra . So [facility] [phone].    ATENCIÓN: Si habla español, tiene a henderson disposición servicios gratuitos de asistencia lingüística. Llame al [phone].    We comply with applicable federal civil rights laws and [place] laws. We do not discriminate on the basis of race, color, national origin, age, disability, sex, sexual orientation, or gender identity.               Review of your medicines      CONTINUE these medicines which have NOT CHANGED        Dose / Directions    acyclovir 400 MG tablet   Commonly known as:  ZOVIRAX        daily   Refills:  0       ADVIL PO        Take by mouth every 4 hours as needed for moderate pain   Refills:  0       ASPIRIN PO        Dose:  81 mg   Take 81 mg by mouth daily   Refills:  0       esomeprazole 40 MG CR capsule   Commonly known as:  nexIUM   Used for:  Dobson's esophagus with low grade dysplasia        Dose:  40 mg   Take 1 capsule (40 mg) by mouth two times daily Take 30-60 minutes before eating.   Quantity:  90 capsule   Refills:  3       FLUoxetine 20 MG capsule   Commonly known as:  PROzac        Dose:  20 mg   Take 20 mg by mouth daily   Refills:  0       MULTI-VITAMINS Tabs        Refills:  0       OMEGA-3 FATTY ACIDS-VITAMIN E PO        Dose:  1 capsule   Take 1 capsule by mouth daily   Refills:  0       pantoprazole 40  MG EC tablet   Commonly known as:  PROTONIX        TAKE 1 TABLET BY MOUTH 2 TIMES EACH DAY   Refills:  11       ranitidine 300 MG tablet   Commonly known as:  ZANTAC        Refills:  0                Protect others around you: Learn how to safely use, store and throw away your medicines at www.disposemymeds.org.             Medication List: This is a list of all your medications and when to take them. Check marks below indicate your daily home schedule. Keep this list as a reference.      Medications           Morning Afternoon Evening Bedtime As Needed    acyclovir 400 MG tablet   Commonly known as:  ZOVIRAX   daily                                ADVIL PO   Take by mouth every 4 hours as needed for moderate pain                                ASPIRIN PO   Take 81 mg by mouth daily                                esomeprazole 40 MG CR capsule   Commonly known as:  nexIUM   Take 1 capsule (40 mg) by mouth two times daily Take 30-60 minutes before eating.                                FLUoxetine 20 MG capsule   Commonly known as:  PROzac   Take 20 mg by mouth daily                                MULTI-VITAMINS Tabs                                OMEGA-3 FATTY ACIDS-VITAMIN E PO   Take 1 capsule by mouth daily                                pantoprazole 40 MG EC tablet   Commonly known as:  PROTONIX   TAKE 1 TABLET BY MOUTH 2 TIMES EACH DAY                                ranitidine 300 MG tablet   Commonly known as:  ZANTAC

## 2018-07-02 NOTE — DISCHARGE INSTRUCTIONS
[facility]  Same-Day Surgery   Adult Discharge Orders & Instructions     For 24 hours after surgery    1. Get plenty of rest.  A responsible adult must stay with you for at least 24 hours after you leave the hospital.   2. Do not drive or use heavy equipment.  If you have weakness or tingling, don't drive or use heavy equipment until this feeling goes away.  3. Do not drink alcohol.  4. Avoid strenuous or risky activities.  Ask for help when climbing stairs.   5. You may feel lightheaded.  IF so, sit for a few minutes before standing.  Have someone help you get up.   6. If you have nausea (feel sick to your stomach): Drink only clear liquids such as apple juice, ginger ale, broth or 7-Up.  Rest may also help.  Be sure to drink enough fluids.  Move to a regular diet as you feel able.  7. You may have a slight fever. Call the doctor if your fever is over 100 F (37.7 C) (taken under the tongue) or lasts longer than 24 hours.  8. You may have a dry mouth, a sore throat, muscle aches or trouble sleeping.  These should go away after 24 hours.  9. Do not make important or legal decisions.   Call your doctor for any of the followin.  Signs of infection (fever, growing tenderness at the surgery site, a large amount of drainage or bleeding, severe pain, foul-smelling drainage, redness, swelling).    2. It has been over 8 to 10 hours since surgery and you are still not able to urinate (pass water).    3.  Headache for over 24 hours.    To contact a doctor, call Dr Avery at [phone] (clinic) or:      [phone] and ask for the resident on call for GASTROENTEROLOGY (answered 24 hours a day)      Emergency Department:    [facility]: [phone]       (TTY for hearing impaired: [phone])    [facility]: [phone]       (TTY for hearing impaired: [phone])

## 2018-07-02 NOTE — ANESTHESIA CARE TRANSFER NOTE
Patient: Solitario Lucia    Procedure(s):  Esophagogastroduodenoscopy With Radio Frequency Ablation  - Wound Class: II-Clean Contaminated    Diagnosis: Dobson's With Low Grade Dysplasia   Diagnosis Additional Information: No value filed.    Anesthesia Type:   General, RSI     Note:  Airway :Face Mask  Patient transferred to:PACU  Comments: To PACU on supplemental O2 with + air exchange, placed on monitor. Report given to RN, questions answered, VSS, patient alert and comfortable.Handoff Report: Identifed the Patient, Identified the Reponsible Provider, Reviewed the pertinent medical history, Discussed the surgical course, Reviewed Intra-OP anesthesia mangement and issues during anesthesia, Set expectations for post-procedure period and Allowed opportunity for questions and acknowledgement of understanding      Vitals: (Last set prior to Anesthesia Care Transfer)    CRNA VITALS  7/2/2018 1111 - 7/2/2018 1146      7/2/2018             Pulse: 74    SpO2: 100 %    Resp Rate (observed): 12                Electronically Signed By: RYNE Nicholas CRNA  July 2, 2018  11:46 AM

## 2018-07-02 NOTE — ANESTHESIA POSTPROCEDURE EVALUATION
Patient: Solitario Lucia    Procedure(s):  Esophagogastroduodenoscopy With Radio Frequency Ablation  - Wound Class: II-Clean Contaminated    Diagnosis:Dobson's With Low Grade Dysplasia   Diagnosis Additional Information: No value filed.    Anesthesia Type:  General, RSI    Note:  Anesthesia Post Evaluation    Patient location during evaluation: PACU  Patient participation: Able to fully participate in evaluation  Level of consciousness: awake and alert  Pain management: adequate  Airway patency: patent  Cardiovascular status: acceptable  Respiratory status: acceptable  Hydration status: acceptable  PONV: none             Last vitals:  Vitals:    07/02/18 0956 07/02/18 1145 07/02/18 1200   BP: 125/83 113/69 100/69   Pulse:  62    Resp: 16 16 16   Temp: 36.8  C (98.2  F) 36.7  C (98  F)    SpO2: 97% 100% 100%         Electronically Signed By: Leo Monterroso MD  July 2, 2018  12:43 PM

## 2018-07-06 ENCOUNTER — CARE COORDINATION (OUTPATIENT)
Dept: GASTROENTEROLOGY | Facility: CLINIC | Age: 53
End: 2018-07-06

## 2018-07-06 ENCOUNTER — TELEPHONE (OUTPATIENT)
Dept: GASTROENTEROLOGY | Facility: CLINIC | Age: 53
End: 2018-07-06

## 2018-07-06 DIAGNOSIS — K22.70 BARRETT ESOPHAGUS: Primary | ICD-10-CM

## 2018-07-06 NOTE — TELEPHONE ENCOUNTER
[facility] Call Center    Phone Message    May a detailed message be left on voicemail: yes    Reason for Call: Other: Patient returning a call to [facility] from today, 7/6/18. Patient stated it was for follow up after his procedure with Dr. Avery.     Action Taken: Message routed to:  [facility] ([facility]): Aaron/Pepito

## 2018-07-06 NOTE — PROGRESS NOTES
Post upper GI end (7/2/2018) with Dr. Avery: Follow-up    Post procedure recommendations: Repeat upper endoscopy with repeat therapy or sampling (pending findings) in 12 weeks   - Continue acid suppression and behavior modification as discussed in clinic     Orders placed: upper gi endoscopy and sent to scheduling.     Message left for him to call with any questions/concerns.     Clinic contact and scheduling numbers verified for future questions/concerns.     Salena MANDEL RN Coordinator  Dr. Washburn, Dr. Avery & Dr. Nazario  Advanced Endoscopy  [phone]

## 2018-07-06 NOTE — TELEPHONE ENCOUNTER
"Message detailed message for Keo with any concerning symptoms.  ~ Advised to go to the ER if develops:    Fevers over 101 +/- chills,    Significant nausea/vomiting causing inability to take in fluids depleting hydration.    severe pain, ongoing symptoms (pain, nausea) that cannot be controlled with prescribed or OTC medication.    Signs of dehydration (pale skin, low blood pressure, lightheadedness, dark urine, \"sticky\" skin when pinched, rapid heart rate, little to no urine output).    Clinic number along with direct line given for him to call Monday with update.     Salena MANDEL RN Coordinator  Dr. Washburn, Dr. Avery & Dr. Nazario   [facility]  [phone]      "

## 2018-07-09 ENCOUNTER — CARE COORDINATION (OUTPATIENT)
Dept: GASTROENTEROLOGY | Facility: CLINIC | Age: 53
End: 2018-07-09

## 2018-07-09 NOTE — PROGRESS NOTES
"Keo called in states he is still having some burning issues in his throat, more with eating but resolves with drinking water. He is still taking his PPI's, but is not always taking them 30-60 min prior to meals. He also had some beers this week but feels like this has no adverse effects on him. He is concerned his food is getting \"stuck\" because the water helps the pain after eating.    Advised him to try to take the meds 30-60 min prior to meals and for the first 1-2 weeks after procedure to avoid alcohol. He will call this RN if symptoms do not improve or worsen.     Salena MANDEL, RN Coordinator  Dr. Washburn, Dr. Avery & Dr. Nazario   [facility]  [phone]        "

## 2018-07-10 ENCOUNTER — DOCUMENTATION ONLY (OUTPATIENT)
Dept: OTHER | Facility: CLINIC | Age: 53
End: 2018-07-10

## 2018-07-10 DIAGNOSIS — Z71.89 ADVANCED DIRECTIVES, COUNSELING/DISCUSSION: Chronic | ICD-10-CM

## 2018-11-05 ENCOUNTER — TELEPHONE (OUTPATIENT)
Dept: GASTROENTEROLOGY | Facility: CLINIC | Age: 53
End: 2018-11-05

## 2018-11-05 NOTE — TELEPHONE ENCOUNTER
Returned call to priya. He would like to get his follow-up procedure scheduled.     Advised will have scheduling call him with date and time.   Order was previously placed.     Salena MANDEL RN Coordinator  Dr. Washburn, Dr. Avery & Dr. Nazario   [facility]  [phone]

## 2018-11-05 NOTE — TELEPHONE ENCOUNTER
[facility] Call Center    Phone Message    May a detailed message be left on voicemail: yes    Reason for Call: Other: pt requesting to schedule an appt with Dr Bennie gamble, please call his cell.     Action Taken: Message routed to:  [facility] panc

## 2018-11-06 ENCOUNTER — TELEPHONE (OUTPATIENT)
Dept: GASTROENTEROLOGY | Facility: CLINIC | Age: 53
End: 2018-11-06

## 2018-11-06 NOTE — TELEPHONE ENCOUNTER
Keo is informed that he is scheduled on 12/17/2018 at 925 AM with an arrival time of 725 AM for an EGD procedure with Dr. Avery.  Patient instructed to get a pre-op physical done prior to his procedure, to arrange for a  and someone to monitor him for at least 24 hours post procedure.  All instructions along with the pre-op physical form will be mailed to pt at his address listed in EPIC, it was confirmed during this call to be current.    SR 11/6/18 @ 141 P

## 2018-12-12 ENCOUNTER — TRANSFERRED RECORDS (OUTPATIENT)
Dept: HEALTH INFORMATION MANAGEMENT | Facility: CLINIC | Age: 53
End: 2018-12-12

## 2018-12-13 RX ORDER — GUAIFENESIN 600 MG/1
600 TABLET, EXTENDED RELEASE ORAL 2 TIMES DAILY
COMMUNITY

## 2018-12-13 RX ORDER — TURMERIC 400 MG
400 CAPSULE ORAL DAILY
COMMUNITY

## 2018-12-13 RX ORDER — ESOMEPRAZOLE MAGNESIUM 40 MG/1
40 CAPSULE, DELAYED RELEASE ORAL
COMMUNITY

## 2018-12-14 NOTE — OR NURSING
Pt has concerns   Received: Today   Message Contents   Michelle Akbar RN Amateau, Abilio Dial MD   Cc: Salena Regalado RN             Hi Dr. Avery,     Pt called in and said he has a sore throat, and occasional dry cough. Temp 99.2. He denies fevers, chest pain, SOB or chest pain. He was concerned because his procedure is Monday. He did say he feels good enough to do it.     I advised him to come for procedure on Monday if nothing has changed and to go to urgent care/MD if he gets worse or has fevers, starts coughing up phlegm, gets CP or SOB. I also told him he can call  [phone] and page the on call MD if he gets worse and needs to cancel.     Please follow up with the pt if needed.     Thanks.     Michelle Akbar RN, BSN   [facility] Preadmission Nursing   [phone]

## 2018-12-17 ENCOUNTER — ANESTHESIA (OUTPATIENT)
Dept: SURGERY | Facility: CLINIC | Age: 53
End: 2018-12-17
Payer: COMMERCIAL

## 2018-12-17 ENCOUNTER — ANESTHESIA EVENT (OUTPATIENT)
Dept: SURGERY | Facility: CLINIC | Age: 53
End: 2018-12-17
Payer: COMMERCIAL

## 2018-12-17 ENCOUNTER — HOSPITAL ENCOUNTER (OUTPATIENT)
Facility: CLINIC | Age: 53
Discharge: HOME | End: 2018-12-17
Attending: INTERNAL MEDICINE | Admitting: INTERNAL MEDICINE
Payer: COMMERCIAL

## 2018-12-17 VITALS
BODY MASS INDEX: 33.95 KG/M2 | TEMPERATURE: 98.4 F | SYSTOLIC BLOOD PRESSURE: 142 MMHG | HEART RATE: 67 BPM | OXYGEN SATURATION: 94 % | WEIGHT: 264.55 LBS | HEIGHT: 74 IN | RESPIRATION RATE: 18 BRPM | DIASTOLIC BLOOD PRESSURE: 87 MMHG

## 2018-12-17 DIAGNOSIS — K22.710 BARRETT'S ESOPHAGUS WITH LOW GRADE DYSPLASIA: Primary | ICD-10-CM

## 2018-12-17 LAB
BUN SERPL-MCNC: 13 MG/DL (ref 7–30)
ERYTHROCYTE [DISTWIDTH] IN BLOOD BY AUTOMATED COUNT: 13 % (ref 10–15)
GLUCOSE BLDC GLUCOMTR-MCNC: 83 MG/DL (ref 70–99)
HCT VFR BLD AUTO: 48.7 % (ref 40–53)
HGB BLD-MCNC: 15.9 G/DL (ref 13.3–17.7)
INR PPP: 0.99 (ref 0.86–1.14)
MCH RBC QN AUTO: 30.5 PG (ref 26.5–33)
MCHC RBC AUTO-ENTMCNC: 32.6 G/DL (ref 31.5–36.5)
MCV RBC AUTO: 93 FL (ref 78–100)
PLATELET # BLD AUTO: 144 10E9/L (ref 150–450)
POTASSIUM SERPL-SCNC: 4.6 MMOL/L (ref 3.4–5.3)
RBC # BLD AUTO: 5.22 10E12/L (ref 4.4–5.9)
UPPER GI ENDOSCOPY: NORMAL
WBC # BLD AUTO: 25.4 10E9/L (ref 4–11)

## 2018-12-17 PROCEDURE — 27210794 ZZH OR GENERAL SUPPLY STERILE: Performed by: INTERNAL MEDICINE

## 2018-12-17 PROCEDURE — 25000128 H RX IP 250 OP 636: Performed by: NURSE ANESTHETIST, CERTIFIED REGISTERED

## 2018-12-17 PROCEDURE — C1888 ENDOVAS NON-CARDIAC ABL CATH: HCPCS | Performed by: INTERNAL MEDICINE

## 2018-12-17 PROCEDURE — 36000064 ZZH SURGERY LEVEL 4 EA 15 ADDTL MIN - UMMC: Performed by: INTERNAL MEDICINE

## 2018-12-17 PROCEDURE — 85027 COMPLETE CBC AUTOMATED: CPT | Performed by: INTERNAL MEDICINE

## 2018-12-17 PROCEDURE — 25000125 ZZHC RX 250: Performed by: INTERNAL MEDICINE

## 2018-12-17 PROCEDURE — 37000009 ZZH ANESTHESIA TECHNICAL FEE, EACH ADDTL 15 MIN: Performed by: INTERNAL MEDICINE

## 2018-12-17 PROCEDURE — 25000125 ZZHC RX 250: Performed by: NURSE ANESTHETIST, CERTIFIED REGISTERED

## 2018-12-17 PROCEDURE — 71000027 ZZH RECOVERY PHASE 2 EACH 15 MINS: Performed by: INTERNAL MEDICINE

## 2018-12-17 PROCEDURE — 40000170 ZZH STATISTIC PRE-PROCEDURE ASSESSMENT II: Performed by: INTERNAL MEDICINE

## 2018-12-17 PROCEDURE — 82962 GLUCOSE BLOOD TEST: CPT

## 2018-12-17 PROCEDURE — 36000062 ZZH SURGERY LEVEL 4 1ST 30 MIN - UMMC: Performed by: INTERNAL MEDICINE

## 2018-12-17 PROCEDURE — 36415 COLL VENOUS BLD VENIPUNCTURE: CPT | Performed by: INTERNAL MEDICINE

## 2018-12-17 PROCEDURE — 84520 ASSAY OF UREA NITROGEN: CPT | Performed by: INTERNAL MEDICINE

## 2018-12-17 PROCEDURE — 84132 ASSAY OF SERUM POTASSIUM: CPT | Performed by: INTERNAL MEDICINE

## 2018-12-17 PROCEDURE — 25000131 ZZH RX MED GY IP 250 OP 636 PS 637: Performed by: ANESTHESIOLOGY

## 2018-12-17 PROCEDURE — 85610 PROTHROMBIN TIME: CPT | Performed by: INTERNAL MEDICINE

## 2018-12-17 PROCEDURE — 37000008 ZZH ANESTHESIA TECHNICAL FEE, 1ST 30 MIN: Performed by: INTERNAL MEDICINE

## 2018-12-17 RX ORDER — SODIUM CHLORIDE, SODIUM LACTATE, POTASSIUM CHLORIDE, CALCIUM CHLORIDE 600; 310; 30; 20 MG/100ML; MG/100ML; MG/100ML; MG/100ML
INJECTION, SOLUTION INTRAVENOUS CONTINUOUS
Status: DISCONTINUED | OUTPATIENT
Start: 2018-12-17 | End: 2018-12-17 | Stop reason: HOSPADM

## 2018-12-17 RX ORDER — FENTANYL CITRATE 50 UG/ML
25-50 INJECTION, SOLUTION INTRAMUSCULAR; INTRAVENOUS
Status: DISCONTINUED | OUTPATIENT
Start: 2018-12-17 | End: 2018-12-17 | Stop reason: HOSPADM

## 2018-12-17 RX ORDER — LIDOCAINE HYDROCHLORIDE 20 MG/ML
INJECTION, SOLUTION INFILTRATION; PERINEURAL PRN
Status: DISCONTINUED | OUTPATIENT
Start: 2018-12-17 | End: 2018-12-17

## 2018-12-17 RX ORDER — ONDANSETRON 4 MG/1
4 TABLET, ORALLY DISINTEGRATING ORAL EVERY 30 MIN PRN
Status: DISCONTINUED | OUTPATIENT
Start: 2018-12-17 | End: 2018-12-17 | Stop reason: HOSPADM

## 2018-12-17 RX ORDER — HYDROMORPHONE HYDROCHLORIDE 1 MG/ML
.3-.5 INJECTION, SOLUTION INTRAMUSCULAR; INTRAVENOUS; SUBCUTANEOUS EVERY 10 MIN PRN
Status: DISCONTINUED | OUTPATIENT
Start: 2018-12-17 | End: 2018-12-17 | Stop reason: HOSPADM

## 2018-12-17 RX ORDER — PROPOFOL 10 MG/ML
INJECTION, EMULSION INTRAVENOUS PRN
Status: DISCONTINUED | OUTPATIENT
Start: 2018-12-17 | End: 2018-12-17

## 2018-12-17 RX ORDER — NALOXONE HYDROCHLORIDE 0.4 MG/ML
.1-.4 INJECTION, SOLUTION INTRAMUSCULAR; INTRAVENOUS; SUBCUTANEOUS
Status: DISCONTINUED | OUTPATIENT
Start: 2018-12-17 | End: 2018-12-17 | Stop reason: HOSPADM

## 2018-12-17 RX ORDER — ONDANSETRON 2 MG/ML
INJECTION INTRAMUSCULAR; INTRAVENOUS PRN
Status: DISCONTINUED | OUTPATIENT
Start: 2018-12-17 | End: 2018-12-17

## 2018-12-17 RX ORDER — ONDANSETRON 2 MG/ML
4 INJECTION INTRAMUSCULAR; INTRAVENOUS EVERY 30 MIN PRN
Status: DISCONTINUED | OUTPATIENT
Start: 2018-12-17 | End: 2018-12-17 | Stop reason: HOSPADM

## 2018-12-17 RX ORDER — LIDOCAINE 40 MG/G
CREAM TOPICAL
Status: DISCONTINUED | OUTPATIENT
Start: 2018-12-17 | End: 2018-12-17 | Stop reason: HOSPADM

## 2018-12-17 RX ORDER — ONDANSETRON 2 MG/ML
4 INJECTION INTRAMUSCULAR; INTRAVENOUS EVERY 6 HOURS PRN
Status: DISCONTINUED | OUTPATIENT
Start: 2018-12-17 | End: 2018-12-17 | Stop reason: HOSPADM

## 2018-12-17 RX ORDER — FLUMAZENIL 0.1 MG/ML
0.2 INJECTION, SOLUTION INTRAVENOUS
Status: DISCONTINUED | OUTPATIENT
Start: 2018-12-17 | End: 2018-12-17 | Stop reason: HOSPADM

## 2018-12-17 RX ORDER — PROPOFOL 10 MG/ML
INJECTION, EMULSION INTRAVENOUS CONTINUOUS PRN
Status: DISCONTINUED | OUTPATIENT
Start: 2018-12-17 | End: 2018-12-17

## 2018-12-17 RX ORDER — SODIUM CHLORIDE, SODIUM LACTATE, POTASSIUM CHLORIDE, CALCIUM CHLORIDE 600; 310; 30; 20 MG/100ML; MG/100ML; MG/100ML; MG/100ML
INJECTION, SOLUTION INTRAVENOUS CONTINUOUS PRN
Status: DISCONTINUED | OUTPATIENT
Start: 2018-12-17 | End: 2018-12-17

## 2018-12-17 RX ORDER — MEPERIDINE HYDROCHLORIDE 25 MG/ML
12.5 INJECTION INTRAMUSCULAR; INTRAVENOUS; SUBCUTANEOUS
Status: DISCONTINUED | OUTPATIENT
Start: 2018-12-17 | End: 2018-12-17 | Stop reason: HOSPADM

## 2018-12-17 RX ORDER — ONDANSETRON 4 MG/1
4 TABLET, ORALLY DISINTEGRATING ORAL EVERY 6 HOURS PRN
Status: DISCONTINUED | OUTPATIENT
Start: 2018-12-17 | End: 2018-12-17 | Stop reason: HOSPADM

## 2018-12-17 RX ADMIN — LIDOCAINE HYDROCHLORIDE 60 MG: 20 INJECTION, SOLUTION INFILTRATION; PERINEURAL at 09:36

## 2018-12-17 RX ADMIN — SODIUM CHLORIDE, POTASSIUM CHLORIDE, SODIUM LACTATE AND CALCIUM CHLORIDE: 600; 310; 30; 20 INJECTION, SOLUTION INTRAVENOUS at 09:18

## 2018-12-17 RX ADMIN — ONDANSETRON 4 MG: 2 INJECTION INTRAMUSCULAR; INTRAVENOUS at 09:50

## 2018-12-17 RX ADMIN — MIDAZOLAM 2 MG: 1 INJECTION INTRAMUSCULAR; INTRAVENOUS at 09:18

## 2018-12-17 RX ADMIN — PROPOFOL 120 MG: 10 INJECTION, EMULSION INTRAVENOUS at 09:36

## 2018-12-17 RX ADMIN — ONDANSETRON 4 MG: 4 TABLET, ORALLY DISINTEGRATING ORAL at 10:28

## 2018-12-17 RX ADMIN — PROPOFOL 200 MCG/KG/MIN: 10 INJECTION, EMULSION INTRAVENOUS at 09:36

## 2018-12-17 RX ADMIN — PROPOFOL 30 MG: 10 INJECTION, EMULSION INTRAVENOUS at 09:38

## 2018-12-17 ASSESSMENT — MIFFLIN-ST. JEOR: SCORE: 2124.75

## 2018-12-17 NOTE — ANESTHESIA POSTPROCEDURE EVALUATION
Anesthesia POST Procedure Evaluation    Patient: Solitario Lucia   MRN:     [id number] Gender:   male   Age:    53 year old :      1965        Preoperative Diagnosis: Dobson's Esophagus    Procedure(s):  Upper Endoscopy   Postop Comments: No value filed.       Anesthesia Type:  MAC    Reportable Event: NO     PAIN: Uncomplicated   Sign Out status: Comfortable, Well controlled pain     PONV: No PONV   Sign Out status:  No Nausea or Vomiting     Neuro/Psych: Uneventful perioperative course   Sign Out Status: Preoperative baseline; Age appropriate mentation     Airway/Resp.: Uneventful perioperative course   Sign Out Status: Non labored breathing, age appropriate RR; Resp. Status within EXPECTED Parameters     CV: Uneventful perioperative course   Sign Out status: Appropriate BP and perfusion indices; Appropriate HR/Rhythm     Disposition:   Sign Out in:  Phase II  Disposition:  Home  Recovery Course: Uneventful  Follow-Up: Not required           Last Anesthesia Record Vitals:  CRNA VITALS  2018 0921 - 2018 1001      2018             Resp Rate (observed):  18          Last PACU/Preop Vitals:  Vitals:    18 0804 18 0957   BP: (!) 148/100 134/86   Pulse: 67    Resp: 14 18   Temp: 36.7  C (98.1  F)    SpO2: 95% 96%         Electronically Signed By: Gregory Akbar MD, 2018, 10:01 AM

## 2018-12-17 NOTE — ADDENDUM NOTE
Addendum  created 12/17/18 1243 by Amol Montesinos APRN CRNA    Review and Sign - Ready for Procedure

## 2018-12-17 NOTE — ANESTHESIA CARE TRANSFER NOTE
Patient: Solitario Lucia    Procedure(s):  Upper Endoscopy    Diagnosis: Dobson's Esophagus   Diagnosis Additional Information: No value filed.    Anesthesia Type:   No value filed.     Note:  Airway :Room Air  Patient transferred to:Phase II  Comments: Awake, VSS, patent airway, report to RN.Handoff Report: Identifed the Patient, Identified the Reponsible Provider, Reviewed the pertinent medical history, Discussed the surgical course, Reviewed Intra-OP anesthesia mangement and issues during anesthesia, Set expectations for post-procedure period and Allowed opportunity for questions and acknowledgement of understanding      Vitals: (Last set prior to Anesthesia Care Transfer)    CRNA VITALS  12/17/2018 0921 - 12/17/2018 0958      12/17/2018             Pulse:  65    Ht Rate:  65    Temp:  36.6  C (97.9  F)    SpO2:  98 %                Electronically Signed By: RYNE Rivas CRNA  December 17, 2018  9:58 AM

## 2018-12-17 NOTE — ANESTHESIA PREPROCEDURE EVALUATION
Anesthesia Pre-Procedure Evaluation    Patient: Solitario Lucia   MRN:     [id number] Gender:   male   Age:    53 year old :      1965        Preoperative Diagnosis: Dobson's Esophagus    Procedure(s):  Upper Endoscopy     Past Medical History:   Diagnosis Date     Anaplasmosis      Anxiety      Aseptic meningitis      Cervical disc herniation      CLL (chronic lymphocytic leukemia) (H)      Colitis      Esophageal reflux      Persistent lymphocytosis      Terminal ileitis (H)       Past Surgical History:   Procedure Laterality Date     ESOPHAGOSCOPY, GASTROSCOPY, DUODENOSCOPY (EGD) WITH RADIO FREQUENCY ABLATION, COMBINED N/A 2018    Procedure: COMBINED ESOPHAGOSCOPY, GASTROSCOPY, DUODENOSCOPY (EGD) WITH RADIO FREQUENCY ABLATION;  Esophagogastroduodenoscopy With Radio Frequency Ablation ;  Surgeon: Abilio Avery MD;  Location: [facility] OR     HERNIA REPAIR            Anesthesia Evaluation     . Pt has had prior anesthetic. Type: General    No history of anesthetic complications          ROS/MED HX    ENT/Pulmonary:       Neurologic:       Cardiovascular:         METS/Exercise Tolerance:     Hematologic:         Musculoskeletal:         GI/Hepatic:         Renal/Genitourinary:         Endo:         Psychiatric:         Infectious Disease:         Malignancy:         Other:                         PHYSICAL EXAM:   Mental Status/Neuro: A/A/O   Airway:   Mallampati: I   Respiratory:   Resp. Rate: Normal     Resp. Effort: Normal      CV: Rhythm: Regular  Rate: Age appropriate   Comments:                    No results found for: WBC, HGB, HCT, PLT, CRP, SED, NA, POTASSIUM, CHLORIDE, CO2, BUN, CR, GLC, ROBIN, PHOS, MAG, ALBUMIN, PROTTOTAL, ALT, AST, GGT, ALKPHOS, BILITOTAL, BILIDIRECT, LIPASE, AMYLASE, CECIL, PTT, INR, FIBR, TSH, T4, T3, HCG, HCGS, CKTOTAL, CKMB, TROPN    Preop Vitals  BP Readings from Last 3 Encounters:   18 120/88   18 124/77   02/12/15 141/81    Pulse Readings from Last 3  "Encounters:   07/02/18 62   06/21/18 62   02/12/15 71      Resp Readings from Last 3 Encounters:   07/02/18 16    SpO2 Readings from Last 3 Encounters:   07/02/18 98%   06/21/18 97%   02/12/15 96%      Temp Readings from Last 1 Encounters:   07/02/18 37.1  C (98.7  F) (Oral)    Ht Readings from Last 1 Encounters:   07/02/18 1.88 m (6' 2.02\")      Wt Readings from Last 1 Encounters:   07/02/18 120.1 kg (264 lb 12.4 oz)    Estimated body mass index is 33.98 kg/m  as calculated from the following:    Height as of 7/2/18: 1.88 m (6' 2.02\").    Weight as of 7/2/18: 120.1 kg (264 lb 12.4 oz).     LDA:            Assessment:   ASA SCORE: 2    NPO Status: > 6 hours since completed Solid Foods   Documentation: H&P complete; Preop Testing complete; Consents complete   Proceeding: Proceed without further delay  Tobacco Use:  Active user of Tobacco     Plan:   Anes. Type:  MAC   Pre-Induction: Midazolam IV   Induction:  Not applicable   Airway: Native Airway   Access/Monitoring: PIV   Maintenance: N/a   Emergence: N/a   Logistics: Same Day Surgery     Postop Pain/Sedation Strategy:  Standard-Options: Opioids PRN     PONV Management:  Adult Risk Factors:, Postop Opioids     CONSENT: Direct conversation   Plan and risks discussed with: Patient   Blood Products: Consent Deferred (Minimal Blood Loss)                         Gregory Akbar MD  "

## 2018-12-24 ENCOUNTER — PATIENT OUTREACH (OUTPATIENT)
Dept: GASTROENTEROLOGY | Facility: CLINIC | Age: 53
End: 2018-12-24

## 2018-12-24 DIAGNOSIS — K22.70 BARRETT ESOPHAGUS: Primary | ICD-10-CM

## 2018-12-24 NOTE — PROGRESS NOTES
"Post upper GI Endoscopy  (12/17/2018) with Dr. Avery: Follow-up    Post procedure recommendations: Continue reflux precautions and medications (PPI) as well as other lifestyle modifications   - Liquids for the next 36h followed by slow progression    to small meal diet   - Chest pain expected over the next 24h; this should not   be progressive or associated with shortness of breath;   we will prescribe viscous lidocaine to be taken PRN   - Repeat EGD in 3m with plan for resampling     Orders placed: EGD and sent to scheduling.     Patient states he is doing well with no issues. Denies nausea, vomiting, fever and pain. States he sometimes has some twinges and can tell when he eats \"too much.\"  Taking in PO with no issues.     Clinic contact and scheduling numbers verified for future questions/concerns.     Salena MANDEL RN Coordinator  Dr. Washburn, Dr. Avery & Dr. Nazario  [facility]  [phone]          "

## 2019-01-22 ENCOUNTER — HOSPITAL ENCOUNTER (OUTPATIENT)
Facility: CLINIC | Age: 54
End: 2019-01-22
Attending: INTERNAL MEDICINE | Admitting: INTERNAL MEDICINE
Payer: COMMERCIAL

## 2019-01-22 ENCOUNTER — TELEPHONE (OUTPATIENT)
Dept: GASTROENTEROLOGY | Facility: CLINIC | Age: 54
End: 2019-01-22

## 2019-01-22 NOTE — TELEPHONE ENCOUNTER
LVM for patient in regards to scheduled procedure information. Left direct line for patient to return phone call to go over procedure.     1/22/19 323pm

## 2019-03-02 ENCOUNTER — HOME INFUSION (PRE-WILLOW HOME INFUSION) (OUTPATIENT)
Dept: PHARMACY | Facility: CLINIC | Age: 54
End: 2019-03-02

## 2019-03-04 NOTE — PROGRESS NOTES
This is a recent snapshot of the patient's [facility] medical record.  For current drug dose and complete information and questions, call [phone] or In Basket pool, [facility] infusion ([id number])  CSN Number:  [id number]

## 2019-03-07 ENCOUNTER — HOME INFUSION (PRE-WILLOW HOME INFUSION) (OUTPATIENT)
Dept: PHARMACY | Facility: CLINIC | Age: 54
End: 2019-03-07

## 2019-03-08 ENCOUNTER — ANESTHESIA EVENT (OUTPATIENT)
Dept: SURGERY | Facility: CLINIC | Age: 54
End: 2019-03-08

## 2019-03-08 ENCOUNTER — TELEPHONE (OUTPATIENT)
Dept: GASTROENTEROLOGY | Facility: CLINIC | Age: 54
End: 2019-03-08

## 2019-03-08 NOTE — OR NURSING
Called pt for pre-op call. He has been hospitalized for Meningitis at [place]. Discharged 3/2/19. Still on Acyclovir IV TID through PICC. Dr. Avery consulted. He said pt should cancel and reschedule when he is done with IV's and feeling better. Pt notified and given number for Dr Avery's . I also told him he will need to see his PCP prior to the procedure to make sure he is cleared. FELIPE Padgett. Dr Avery's  that the procedure on 3/11 should be cancelled.

## 2019-03-08 NOTE — TELEPHONE ENCOUNTER
Michelle Akbar, RN  Sabrina Montano Steffane             Called pt for pre-op call. He has been hospitalized for Meningitis at [place]. Discharged 3/2/19. Still on Acyclovir IV TID through PICC. Dr. Avery consulted. He said pt should cancel and reschedule when he is done with IV's and feeling better.     Thanks.     Michelle Akbar RN, BSN   [facility] Preadmission Nursing   [phone]        Cancelled procedure due to message received above.     3/8/19 337pm

## 2019-03-08 NOTE — OR NURSING
Michelle Akbar, Sabrina Canela Steffane             Called pt for pre-op call. He has been hospitalized for Meningitis at [place]. Discharged 3/2/19. Still on Acyclovir IV TID through PICC. Dr. Avery consulted. He said pt should cancel and reschedule when he is done with IV's and feeling better.     Thanks.     Michelle Akbar RN, BSN   [facility] Preadmission Nursing   [phone]

## 2019-03-08 NOTE — PROGRESS NOTES
This is a recent snapshot of the patient's [facility] medical record.  For current drug dose and complete information and questions, call [phone] or In Basket pool, [facility] home infusion ([id number])  CSN Number:  [id number]

## 2019-03-11 ENCOUNTER — ANESTHESIA (OUTPATIENT)
Dept: SURGERY | Facility: CLINIC | Age: 54
End: 2019-03-11

## 2019-03-12 ENCOUNTER — CARE COORDINATION (OUTPATIENT)
Dept: GASTROENTEROLOGY | Facility: CLINIC | Age: 54
End: 2019-03-12

## 2019-03-12 NOTE — PROGRESS NOTES
Received message from Lorin:   This patient left a voicemail stating he needs more needles and syringes.     Can you call him?     Thank you,   Lorin     Spoke to patient and gave him [facility] number to call to get those supplies.     BLAZE [address] Dr. Avery, & Dr. Nazario  [facility]  [phone]

## 2019-04-15 ENCOUNTER — TELEPHONE (OUTPATIENT)
Dept: GASTROENTEROLOGY | Facility: CLINIC | Age: 54
End: 2019-04-15

## 2019-04-15 NOTE — TELEPHONE ENCOUNTER
[facility] Call Center    Phone Message    May a detailed message be left on voicemail: yes    Reason for Call: Other: Patient called to schedule with Dr. Avery. Please call with appointment options.    Action Taken: Message routed to:  [facility] SANDY GERMAN

## 2019-04-16 ENCOUNTER — TELEPHONE (OUTPATIENT)
Dept: GASTROENTEROLOGY | Facility: CLINIC | Age: 54
End: 2019-04-16

## 2019-04-16 DIAGNOSIS — K22.70 BARRETT'S ESOPHAGUS WITHOUT DYSPLASIA: Primary | ICD-10-CM

## 2019-04-16 NOTE — TELEPHONE ENCOUNTER
Called to offer appt to patient. First available with Dr. Avery is 5/23. Left message for him to call back to schedule.    Daphne Harding, RN Care Coordinator

## 2019-04-16 NOTE — TELEPHONE ENCOUNTER
Keo called to reschedule his EGD w/ Amateau he had to cancel d/t a recent hospitalization.    Ordered entered and forwarded to scheduling. Pt advised someone will call to coordinate.    Daphne Harding RN Care Coordinator

## 2019-05-06 ENCOUNTER — TELEPHONE (OUTPATIENT)
Dept: GASTROENTEROLOGY | Facility: CLINIC | Age: 54
End: 2019-05-06

## 2019-05-06 NOTE — TELEPHONE ENCOUNTER
Spoke to patient in regards to scheduled procedure. Informed patient he is scheduled with Dr. Avery on 6/24/19. Informed patient he will need an updated pre-op physical within 30 days of his procedure. Patient stated he is going to have this done locally. Informed patient he will need a  and someone to monitor him for 24 hours after the procedure. Informed patient all scheduling details will be sent to the address listed on Epic. Address confirmed on this call.     5/6/19 1127am

## 2019-06-24 ENCOUNTER — HOSPITAL ENCOUNTER (OUTPATIENT)
Facility: CLINIC | Age: 54
Discharge: HOME | End: 2019-06-24
Attending: INTERNAL MEDICINE | Admitting: INTERNAL MEDICINE
Payer: COMMERCIAL

## 2019-06-24 ENCOUNTER — ANESTHESIA (OUTPATIENT)
Dept: SURGERY | Facility: CLINIC | Age: 54
End: 2019-06-24
Payer: COMMERCIAL

## 2019-06-24 ENCOUNTER — ANESTHESIA EVENT (OUTPATIENT)
Dept: SURGERY | Facility: CLINIC | Age: 54
End: 2019-06-24
Payer: COMMERCIAL

## 2019-06-24 VITALS
RESPIRATION RATE: 16 BRPM | DIASTOLIC BLOOD PRESSURE: 70 MMHG | SYSTOLIC BLOOD PRESSURE: 110 MMHG | WEIGHT: 246.91 LBS | HEART RATE: 51 BPM | TEMPERATURE: 98.5 F | HEIGHT: 73 IN | OXYGEN SATURATION: 96 % | BODY MASS INDEX: 32.72 KG/M2

## 2019-06-24 LAB
ANION GAP SERPL CALCULATED.3IONS-SCNC: 7 MMOL/L (ref 3–14)
BUN SERPL-MCNC: 14 MG/DL (ref 7–30)
CALCIUM SERPL-MCNC: 8.7 MG/DL (ref 8.5–10.1)
CHLORIDE SERPL-SCNC: 106 MMOL/L (ref 94–109)
CO2 SERPL-SCNC: 24 MMOL/L (ref 20–32)
CREAT SERPL-MCNC: 0.96 MG/DL (ref 0.66–1.25)
GFR SERPL CREATININE-BSD FRML MDRD: >90 ML/MIN/{1.73_M2}
GLUCOSE BLDC GLUCOMTR-MCNC: 97 MG/DL (ref 70–99)
GLUCOSE SERPL-MCNC: 93 MG/DL (ref 70–99)
HGB BLD-MCNC: 14.6 G/DL (ref 13.3–17.7)
POTASSIUM SERPL-SCNC: 3.8 MMOL/L (ref 3.4–5.3)
SODIUM SERPL-SCNC: 138 MMOL/L (ref 133–144)
UPPER GI ENDOSCOPY: NORMAL

## 2019-06-24 PROCEDURE — 25000125 ZZHC RX 250: Performed by: INTERNAL MEDICINE

## 2019-06-24 PROCEDURE — 27210794 ZZH OR GENERAL SUPPLY STERILE: Performed by: INTERNAL MEDICINE

## 2019-06-24 PROCEDURE — 80048 BASIC METABOLIC PNL TOTAL CA: CPT | Performed by: ANESTHESIOLOGY

## 2019-06-24 PROCEDURE — 71000027 ZZH RECOVERY PHASE 2 EACH 15 MINS: Performed by: INTERNAL MEDICINE

## 2019-06-24 PROCEDURE — 36415 COLL VENOUS BLD VENIPUNCTURE: CPT | Performed by: ANESTHESIOLOGY

## 2019-06-24 PROCEDURE — 82962 GLUCOSE BLOOD TEST: CPT

## 2019-06-24 PROCEDURE — 36000051 ZZH SURGERY LEVEL 2 1ST 30 MIN - UMMC: Performed by: INTERNAL MEDICINE

## 2019-06-24 PROCEDURE — 25800030 ZZH RX IP 258 OP 636: Performed by: NURSE ANESTHETIST, CERTIFIED REGISTERED

## 2019-06-24 PROCEDURE — 36000053 ZZH SURGERY LEVEL 2 EA 15 ADDTL MIN - UMMC: Performed by: INTERNAL MEDICINE

## 2019-06-24 PROCEDURE — 85018 HEMOGLOBIN: CPT | Performed by: ANESTHESIOLOGY

## 2019-06-24 PROCEDURE — 37000009 ZZH ANESTHESIA TECHNICAL FEE, EACH ADDTL 15 MIN: Performed by: INTERNAL MEDICINE

## 2019-06-24 PROCEDURE — 40000170 ZZH STATISTIC PRE-PROCEDURE ASSESSMENT II: Performed by: INTERNAL MEDICINE

## 2019-06-24 PROCEDURE — 37000008 ZZH ANESTHESIA TECHNICAL FEE, 1ST 30 MIN: Performed by: INTERNAL MEDICINE

## 2019-06-24 PROCEDURE — 25000125 ZZHC RX 250: Performed by: NURSE ANESTHETIST, CERTIFIED REGISTERED

## 2019-06-24 PROCEDURE — 88305 TISSUE EXAM BY PATHOLOGIST: CPT | Performed by: INTERNAL MEDICINE

## 2019-06-24 PROCEDURE — 25000128 H RX IP 250 OP 636: Performed by: NURSE ANESTHETIST, CERTIFIED REGISTERED

## 2019-06-24 RX ORDER — FENTANYL CITRATE 50 UG/ML
INJECTION, SOLUTION INTRAMUSCULAR; INTRAVENOUS PRN
Status: DISCONTINUED | OUTPATIENT
Start: 2019-06-24 | End: 2019-06-24

## 2019-06-24 RX ORDER — MEPERIDINE HYDROCHLORIDE 50 MG/ML
12.5 INJECTION INTRAMUSCULAR; INTRAVENOUS; SUBCUTANEOUS
Status: DISCONTINUED | OUTPATIENT
Start: 2019-06-24 | End: 2019-06-24 | Stop reason: HOSPADM

## 2019-06-24 RX ORDER — NALOXONE HYDROCHLORIDE 0.4 MG/ML
.1-.4 INJECTION, SOLUTION INTRAMUSCULAR; INTRAVENOUS; SUBCUTANEOUS
Status: DISCONTINUED | OUTPATIENT
Start: 2019-06-24 | End: 2019-06-24 | Stop reason: HOSPADM

## 2019-06-24 RX ORDER — SODIUM CHLORIDE, SODIUM LACTATE, POTASSIUM CHLORIDE, CALCIUM CHLORIDE 600; 310; 30; 20 MG/100ML; MG/100ML; MG/100ML; MG/100ML
INJECTION, SOLUTION INTRAVENOUS CONTINUOUS
Status: DISCONTINUED | OUTPATIENT
Start: 2019-06-24 | End: 2019-06-24 | Stop reason: HOSPADM

## 2019-06-24 RX ORDER — ONDANSETRON 4 MG/1
4 TABLET, ORALLY DISINTEGRATING ORAL EVERY 30 MIN PRN
Status: DISCONTINUED | OUTPATIENT
Start: 2019-06-24 | End: 2019-06-24 | Stop reason: HOSPADM

## 2019-06-24 RX ORDER — LIDOCAINE HYDROCHLORIDE 20 MG/ML
INJECTION, SOLUTION INFILTRATION; PERINEURAL PRN
Status: DISCONTINUED | OUTPATIENT
Start: 2019-06-24 | End: 2019-06-24

## 2019-06-24 RX ORDER — LIDOCAINE 40 MG/G
CREAM TOPICAL
Status: DISCONTINUED | OUTPATIENT
Start: 2019-06-24 | End: 2019-06-24 | Stop reason: HOSPADM

## 2019-06-24 RX ORDER — PROPOFOL 10 MG/ML
INJECTION, EMULSION INTRAVENOUS CONTINUOUS PRN
Status: DISCONTINUED | OUTPATIENT
Start: 2019-06-24 | End: 2019-06-24

## 2019-06-24 RX ORDER — PROPOFOL 10 MG/ML
INJECTION, EMULSION INTRAVENOUS PRN
Status: DISCONTINUED | OUTPATIENT
Start: 2019-06-24 | End: 2019-06-24

## 2019-06-24 RX ORDER — ONDANSETRON 2 MG/ML
INJECTION INTRAMUSCULAR; INTRAVENOUS PRN
Status: DISCONTINUED | OUTPATIENT
Start: 2019-06-24 | End: 2019-06-24

## 2019-06-24 RX ORDER — ONDANSETRON 2 MG/ML
4 INJECTION INTRAMUSCULAR; INTRAVENOUS
Status: DISCONTINUED | OUTPATIENT
Start: 2019-06-24 | End: 2019-06-24 | Stop reason: HOSPADM

## 2019-06-24 RX ORDER — SODIUM CHLORIDE, SODIUM LACTATE, POTASSIUM CHLORIDE, CALCIUM CHLORIDE 600; 310; 30; 20 MG/100ML; MG/100ML; MG/100ML; MG/100ML
INJECTION, SOLUTION INTRAVENOUS CONTINUOUS PRN
Status: DISCONTINUED | OUTPATIENT
Start: 2019-06-24 | End: 2019-06-24

## 2019-06-24 RX ORDER — ACETAMINOPHEN 325 MG/1
975 TABLET ORAL ONCE
Status: DISCONTINUED | OUTPATIENT
Start: 2019-06-24 | End: 2019-06-24 | Stop reason: HOSPADM

## 2019-06-24 RX ORDER — ONDANSETRON 2 MG/ML
4 INJECTION INTRAMUSCULAR; INTRAVENOUS EVERY 30 MIN PRN
Status: DISCONTINUED | OUTPATIENT
Start: 2019-06-24 | End: 2019-06-24 | Stop reason: HOSPADM

## 2019-06-24 RX ADMIN — ONDANSETRON 4 MG: 2 INJECTION INTRAMUSCULAR; INTRAVENOUS at 07:38

## 2019-06-24 RX ADMIN — SODIUM CHLORIDE, POTASSIUM CHLORIDE, SODIUM LACTATE AND CALCIUM CHLORIDE: 600; 310; 30; 20 INJECTION, SOLUTION INTRAVENOUS at 07:08

## 2019-06-24 RX ADMIN — LIDOCAINE HYDROCHLORIDE 40 MG: 20 INJECTION, SOLUTION INFILTRATION; PERINEURAL at 07:13

## 2019-06-24 RX ADMIN — PROPOFOL 100 MCG/KG/MIN: 10 INJECTION, EMULSION INTRAVENOUS at 07:22

## 2019-06-24 RX ADMIN — MIDAZOLAM 2 MG: 1 INJECTION INTRAMUSCULAR; INTRAVENOUS at 07:08

## 2019-06-24 RX ADMIN — FENTANYL CITRATE 25 MCG: 50 INJECTION, SOLUTION INTRAMUSCULAR; INTRAVENOUS at 07:57

## 2019-06-24 RX ADMIN — PROPOFOL 40 MG: 10 INJECTION, EMULSION INTRAVENOUS at 07:17

## 2019-06-24 RX ADMIN — FENTANYL CITRATE 50 MCG: 50 INJECTION, SOLUTION INTRAMUSCULAR; INTRAVENOUS at 07:13

## 2019-06-24 ASSESSMENT — MIFFLIN-ST. JEOR: SCORE: 2028.88

## 2019-06-24 NOTE — ANESTHESIA POSTPROCEDURE EVALUATION
Anesthesia POST Procedure Evaluation    Patient: Solitario Lucia   MRN:     [id number] Gender:   male   Age:    53 year old :      1965        Preoperative Diagnosis: Dobson's Esophagus Without Dysplasia   Procedure(s):  Esophagogastroduodenoscopy, Upper Endoscopy with Biopsy   Postop Comments: No value filed.       Anesthesia Type:  General  No value filed.    Reportable Event: NO     PAIN: Uncomplicated   Sign Out status: Comfortable, Well controlled pain     PONV: No PONV   Sign Out status:  No Nausea or Vomiting     Neuro/Psych: Uneventful perioperative course   Sign Out Status: Preoperative baseline; Age appropriate mentation     Airway/Resp.: Uneventful perioperative course   Sign Out Status: Airway Device present     CV: Uneventful perioperative course   Sign Out status: Appropriate BP and perfusion indices; Appropriate HR/Rhythm     Disposition:   Sign Out in:  PACU  Disposition:  Phase II; Home  Recovery Course: Uneventful  Follow-Up: Not required           Last Anesthesia Record Vitals:  CRNA VITALS  2019 0718 - 2019 0803      2019             NIBP:  104/57    Pulse:  49  (Abnormal)     SpO2:  97 %    Resp Rate (observed):  14          Last PACU Vitals:  Vitals Value Taken Time   BP     Temp     Pulse     Resp     SpO2     Temp src     NIBP 86/51 2019  7:41 AM   Pulse 46 2019  7:45 AM   SpO2 98 % 2019  7:45 AM   Resp     Temp     Ht Rate 49 2019  7:41 AM   Temp 2           Electronically Signed By: Vandana Herzog MD, 2019, 8:03 AM

## 2019-06-24 NOTE — DISCHARGE INSTRUCTIONS
[facility]  Same-Day Surgery   Adult Discharge Orders & Instructions     For 24 hours after surgery    1. Get plenty of rest.  A responsible adult must stay with you for at least 24 hours after you leave the hospital.   2. Do not drive or use heavy equipment.  If you have weakness or tingling, don't drive or use heavy equipment until this feeling goes away.  3. Do not drink alcohol.  4. Avoid strenuous or risky activities.  Ask for help when climbing stairs.   5. You may feel lightheaded.  IF so, sit for a few minutes before standing.  Have someone help you get up.   6. If you have nausea (feel sick to your stomach): Drink only clear liquids such as apple juice, ginger ale, broth or 7-Up.  Rest may also help.  Be sure to drink enough fluids.  Move to a regular diet as you feel able.  7. You may have a slight fever. Call the doctor if your fever is over 100 F (37.7 C) (taken under the tongue) or lasts longer than 24 hours.  8. You may have a dry mouth, a sore throat, muscle aches or trouble sleeping.  These should go away after 24 hours.  9. Do not make important or legal decisions.   Call your doctor for any of the followin.  Signs of infection (fever, growing tenderness at the surgery site, a large amount of drainage or bleeding, severe pain, foul-smelling drainage, redness, swelling).    2. It has been over 8 to 10 hours since surgery and you are still not able to urinate (pass water).    3.  Headache for over 24 hours.      To contact a doctor, call Dr Avery at [phone] (clinic)   or:        [phone] and ask for the resident on call for   Gastroenerology/ GI medicine (answered 24 hours a day)      Emergency Department:    [facility]: [phone]         Resume pre procedure diet.

## 2019-06-24 NOTE — ANESTHESIA PREPROCEDURE EVALUATION
Anesthesia Pre-Procedure Evaluation    Patient: Solitario Lucia   MRN:     [id number] Gender:   male   Age:    53 year old :      1965        Preoperative Diagnosis: Dobson's Esophagus Without Dysplasia   Procedure(s):  Esophagogastroduodenoscopy     Past Medical History:   Diagnosis Date     Anaplasmosis      Anxiety      Aseptic meningitis      Cervical disc herniation      CLL (chronic lymphocytic leukemia) (H)      Colitis      Esophageal reflux      Persistent lymphocytosis      Terminal ileitis (H)       Past Surgical History:   Procedure Laterality Date     ESOPHAGOSCOPY, GASTROSCOPY, DUODENOSCOPY (EGD) WITH RADIO FREQUENCY ABLATION, COMBINED N/A 2018    Procedure: COMBINED ESOPHAGOSCOPY, GASTROSCOPY, DUODENOSCOPY (EGD) WITH RADIO FREQUENCY ABLATION;  Esophagogastroduodenoscopy With Radio Frequency Ablation ;  Surgeon: Abilio Avery MD;  Location: [facility] OR     ESOPHAGOSCOPY, GASTROSCOPY, DUODENOSCOPY (EGD) WITH RADIO FREQUENCY ABLATION, COMBINED N/A 2018    Procedure: Upper Endoscopy;  Surgeon: Abilio Avery MD;  Location: [facility] OR     HERNIA REPAIR            Anesthesia Evaluation     . Pt has had prior anesthetic. Type: General           ROS/MED HX    ENT/Pulmonary:  - neg pulmonary ROS     Neurologic:     (+)other neuro aseptic meningitis, cervical disc herniation    Cardiovascular:  - neg cardiovascular ROS       METS/Exercise Tolerance:  >4 METS   Hematologic:     (+) Other Hematologic Disorder-anaplasmosis      Musculoskeletal:   (+)  other musculoskeletal- cervical herniation      GI/Hepatic:     (+) GERD Asymptomatic on medication, Other GI/Hepatic colitis, terminal ileitis      Renal/Genitourinary:  - ROS Renal section negative       Endo:  - neg endo ROS       Psychiatric:     (+) psychiatric history anxiety      Infectious Disease:   (+) Other Infectious Disease       Malignancy:   (+) Malignancy (hx of viral meningitis) History of Lymphoma/Leukemia  Lymph CA Active  "status post,         Other:    (+) No chance of pregnancy C-spine cleared: No, no H/O Chronic Pain,no other significant disability                        PHYSICAL EXAM:   Mental Status/Neuro: A/A/O   Airway: Facies: Feasible  Mallampati: I  Mouth/Opening: Full  TM distance: > 6 cm  Neck ROM: Full   Respiratory: Auscultation: CTAB     Resp. Rate: Normal     Resp. Effort: Normal      CV: Rhythm: Regular  Rate: Age appropriate  Heart: Normal Sounds   Comments:      Dental: Normal                  Lab Results   Component Value Date    WBC 25.4 (H) 12/17/2018    HGB 15.9 12/17/2018    HCT 48.7 12/17/2018     (L) 12/17/2018    POTASSIUM 4.6 12/17/2018    BUN 13 12/17/2018    INR 0.99 12/17/2018       Preop Vitals  BP Readings from Last 3 Encounters:   12/17/18 142/87   07/02/18 120/88   06/21/18 124/77    Pulse Readings from Last 3 Encounters:   12/17/18 67   07/02/18 62   06/21/18 62      Resp Readings from Last 3 Encounters:   12/17/18 18   07/02/18 16    SpO2 Readings from Last 3 Encounters:   12/17/18 94%   07/02/18 98%   06/21/18 97%      Temp Readings from Last 1 Encounters:   12/17/18 36.9  C (98.4  F) (Oral)    Ht Readings from Last 1 Encounters:   12/17/18 1.88 m (6' 2\")      Wt Readings from Last 1 Encounters:   12/17/18 120 kg (264 lb 8.8 oz)    Estimated body mass index is 33.97 kg/m  as calculated from the following:    Height as of 12/17/18: 1.88 m (6' 2\").    Weight as of 12/17/18: 120 kg (264 lb 8.8 oz).     LDA:            Assessment:   ASA SCORE: 3       Documentation: H&P complete; Preop Testing complete; Consents complete   Proceeding: Proceed without further delay  Tobacco Use:  NO Active use of Tobacco/UNKNOWN Tobacco use status     Plan:   Anes. Type:  General   Pre-Induction: Midazolam IV; Acetaminophen PO   Induction:  IV (Standard)   Airway: Oral ETT   Access/Monitoring: PIV   Maintenance: Balanced   Emergence: Procedure Site   Logistics: Same Day Surgery     Postop Pain/Sedation " Strategy:  Standard-Options: Opioids PRN     PONV Management:  Adult Risk Factors:, Non-Smoker     CONSENT: Direct conversation   Plan and risks discussed with: Patient   Blood Products: Consented (ALL Blood Products)       Comments for Plan/Consent:  Will need C-mac for history of cervical herniation                         Vandana Herzog MD

## 2019-06-24 NOTE — OP NOTE
Upper GI Endoscopy 06/24/2019  7:10 AM [facility], [address] [place] [address] [phone]     Endoscopy Department   _______________________________________________________________________________   Patient Name: Solitario Lucia           Procedure Date: 6/24/2019 7:10 AM   MRN: [id number]                       Account Number: [id number]   YOB: 1965             Admit Type: Outpatient   Age: 53                                Gender: Male   Note Status: Finalized                Attending MD: Abilio Avery MD   Total Sedation Time:                     _______________________________________________________________________________       Procedure:           Upper GI endoscopy   Indications:         Dobson's low grade dysplasia   Providers:           Abilio Avery MD   Patient Profile:     Mr Lucia is a 54yo gentleman with ongoing reflux found                        to have C0M1 Barretts with low grade dysplasia who now                        returns for continued evaluation and or management by                        upper endoscopy following RFA therapy.   Referring MD:        Deondre Dodd MD   Medicines:           Sedation Administered by an Anesthesia Professional   Complications:       No immediate complications.   _______________________________________________________________________________   Procedure:           Pre-Anesthesia Assessment:                        - Prior to the procedure, a History and Physical was                        performed, and patient medications and allergies were                        reviewed. The patient is competent. The risks and                        benefits of the procedure and the sedation options and                        risks were discussed with the patient. All questions                        were answered and informed consent was obtained. Patient                        identification and proposed  procedure were verified by                        the nurse in the pre-procedure area. Mental Status                        Examination: alert and oriented. Airway Examination:                        Mallampati Class II (the uvula but not tonsillar pillars                        visualized). Respiratory Examination: clear to                        auscultation. CV Examination: normal. ASA Grade                        Assessment: II - A patient with mild systemic disease.                        After reviewing the risks and benefits, the patient was                        deemed in satisfactory condition to undergo the                        procedure. The anesthesia plan was to use deep sedation                        / analgesia. Immediately prior to administration of                        medications, the patient was re-assessed for adequacy to                        receive sedatives. The heart rate, respiratory rate,                        oxygen saturations, blood pressure, adequacy of                        pulmonary ventilation, and response to care were                        monitored throughout the procedure. The physical status                        of the patient was re-assessed after the procedure.                        After obtaining informed consent, the endoscope was                        passed under direct vision. Throughout the procedure,                        the patient's blood pressure, pulse, and oxygen                        saturations were monitored continuously. The gastroscope                        was introduced through the mouth, and advanced to the                        second part of duodenum. The upper GI endoscopy was                        accomplished without difficulty. The patient tolerated                        the procedure well.                                                                                     Findings:        The proximal, mid and distal esophagus  were unremarkable. The        squamocolumnar line was found at the gastroesophageal junction now        without significant irregularity. There was no evidence of residual        tongues nor were there raised or depressed lesions. There was no        evidence of hiatal hernia. The cardia, fundus, body, incisura, and        antrum were unremarkable as were the bulb and proximal sweep. Four        quadrant biopsies were taken on the GEJ (Jar 1, at 40cm) and at 39cm        (Jar 2) from what appeared to be normal squamous tissues.                                                                                     Impression:          - Endoscopic evidence to suggest resolved Barretts                        esophagus without inflammation or lesion, now sampled                        for pathology verification   Recommendation:      - Deep sedation recovery with probable discharge home                        this morning; diet to resume as usual as soon as                        anethesia resolves                        - Continue once daily PPI for the time being                        - Timing of surveillance EGD to be determined by                        pathology (residual dysplasia in three months for                        retreatment vs Barretts without dysplasia nor no                        Barretts in one year)                        - The findings and recommendations were discussed with                        the patient and their family                                                                                       electronically signed by TEJSA Avery

## 2019-06-24 NOTE — OR NURSING
Hand off report received at 0805 from CRNA.  Dr. TEJAS Avery here at that time and spoke with Pt re: OR procedure and findings.  Dr. Monroe here now.  Pt denies pain and and nausea.

## 2019-06-24 NOTE — LETTER
Patient:  Solitario Lucia  :   1965  MRN:     [id number]        Mr.Timothy FELIX Lucia  [address] [place] [id number]        2019    Dear ,    We are writing to inform you of your test results. In short, great news. Your Barretts appears to have resolved. More importantly, there is no longer evidence of dysplasia. As discussed previously, our plan therefore involves surveillance endoscopy in one year.    I have included the formal documtentation of the results below. It continues to be a pleasure participating in your care.  Please feel free to contact our clinic with any further questions.      Sincerely,    Abilio Guerra MD PhD JOHN REYEZ  Director of Endoscopy  Associate Professor of Medicine, Surgery and Pediatrics  Interventional and Therapeutic Endoscopy    [facility]  Division of Gastroenterology and Hepatology  [facility] [address] [place] [id number]    New Consultations  [phone]  Procedure Scheduling [phone]  Clinical Nurse Coordinator [phone]  Clinical Fax   [phone]  Administrative   [phone]  Administrative Fax  [phone]    Resulted Orders   Surgical pathology exam   Result Value Ref Range    Copath Report       Patient Name: SOLITARIO LUCIA  MR#: [id number]  Specimen #: [id number]  Collected: 2019  Received: 2019  Reported: 2019 14:01  Ordering Phy(s): ABILIO GUERRA    For improved result formatting, select 'View Enhanced Report Format' under   Linked Documents section.    SPECIMEN(S):  A: Gastroesophageal junction biopsy. 40cm  B: Gastroesophageal junction biopsy. 39cm    FINAL DIAGNOSIS:  A. GASTROESOPHAGEAL JUNCTION, 40CM, BIOPSY:  - Squamous and gastric type epithelium with mild chronic inflammation  - Negative for intestinal metaplasia or dysplasia    B. GASTROESOPHAGEAL JUNCTION, 39CM, BIOPSY:  - Squamous epithelium with mild chronic inflammation and basal layer  "  hyperplasia suggestive of reflux  - Minute fragment of columnar type epithelium  - Negative for intestinal metaplasia or dysplasia    I have personally reviewed all specimens and/or slides, including the   listed special stains, and used them  with my medical judgement to determine or confirm the final yasmin gnosis.    Electronically signed out by:    Arcelia Bahena M.D., Physicians    CLINICAL HISTORY:  History of Dobson's esophagus with low grade dysplasia.    GROSS:  A:  The specimen is received in formalin with proper patient   identification, labeled \"40 GEJ rule out  dysplasia in Dobson's\".  The specimen consists of five irregular tan   pieces of soft tissue averaging 0.2 cm  in greatest dimension which are entirely submitted in cassette A1.    B:  The specimen is received in formalin with proper patient   identification, labeled \"39 cm GEJ rule out  dysplasia in Dobson's\".  The specimen consists of four irregular tan   pieces of soft tissue ranging from  0.1-0.3 cm in greatest dimension which are entirely submitted in cassette   B1. (Dictated by: Fransisco Grove  6/24/2019 09:19 AM)    MICROSCOPIC:  Microscopic examination was performed.    The technical component of this testing was completed at the [facility], with the pr ofessional component performed   at the [facility], [address] [place] [phone] ([phone])    CPT Codes:  A: 30785-JC0  B: 29981-BV6    COLLECTION SITE:  Client: [facility]  Location: [facility])    Resident  IXS1           "

## 2019-06-24 NOTE — ANESTHESIA CARE TRANSFER NOTE
Patient: Solitario Lucia    Procedure(s):  Esophagogastroduodenoscopy, Upper Endoscopy with Biopsy    Diagnosis: Dobson's Esophagus Without Dysplasia  Diagnosis Additional Information: No value filed.    Anesthesia Type:   No value filed.     Note:  Airway :Room Air  Patient transferred to:Phase II  Comments:   -on RA, Pt Spont.  breathing, awake & alert, monitors placed, VSS, no airway, RN not available, will give report on arrival & transfer care.       Vitals: (Last set prior to Anesthesia Care Transfer)    CRNA VITALS  6/24/2019 0718 - 6/24/2019 0759      6/24/2019             Pulse:  46  (Abnormal)     SpO2:  98 %    Resp Rate (observed):  14    EKG:  Sinus bradycardia                Electronically Signed By: RYNE Gonzales CRNA  June 24, 2019  7:59 AM

## 2019-06-25 LAB — COPATH REPORT: NORMAL

## 2019-06-27 ENCOUNTER — CARE COORDINATION (OUTPATIENT)
Dept: GASTROENTEROLOGY | Facility: CLINIC | Age: 54
End: 2019-06-27

## 2019-06-27 NOTE — PROGRESS NOTES
Post EGD (6/24/19) with Dr. Avery: Follow-up     Post procedure recommendations:  - Continue once daily PPI for the time being    - Timing of surveillance EGD to be determined by  pathology (residual dysplasia in three months for retreatment vs Barretts without dysplasia nor no  Barretts in one year  -Letter sent by Dr. Avery stated EGD in 1 year     Orders placed: reminder sent to pool      Patient states he is feeling good. Denies nausea, vomiting, fever and abdominal pain. Eating and drinking PO with no issues. He is aware about follow up plan- EGD in 1 year.     Clinic contact and scheduling numbers verified for future questions/concerns.      BLAZE [address] Dr[address] Dr. Avery, & Dr. Nazario  Advanced Endoscopy  [phone]

## 2020-05-15 ENCOUNTER — PREP FOR PROCEDURE (OUTPATIENT)
Dept: GASTROENTEROLOGY | Facility: CLINIC | Age: 55
End: 2020-05-15

## 2020-05-15 ENCOUNTER — PATIENT OUTREACH (OUTPATIENT)
Dept: GASTROENTEROLOGY | Facility: CLINIC | Age: 55
End: 2020-05-15

## 2020-05-15 DIAGNOSIS — K22.70 BARRETT'S ESOPHAGUS: Primary | ICD-10-CM

## 2020-05-15 NOTE — TELEPHONE ENCOUNTER
Per Dr. Avery  EGD in 1 year per Dr. Avery's letter    Called to discuss with patient. Left message. Orders placed.    Please assist in scheduling:     Procedure/Imaging/Clinic: EGD  Physician: Dr. Avery  Timing: June 2020  Procedure length:50  Anesthesia:general  Dx: Barretts  Tier:3  Location: [facility]     Orders placed to assist w/ f/up    Daphne Harding RN Care Coordinator

## 2020-05-28 ENCOUNTER — TRANSFERRED RECORDS (OUTPATIENT)
Dept: HEALTH INFORMATION MANAGEMENT | Facility: CLINIC | Age: 55
End: 2020-05-28

## 2020-05-29 ENCOUNTER — TRANSFERRED RECORDS (OUTPATIENT)
Dept: HEALTH INFORMATION MANAGEMENT | Facility: CLINIC | Age: 55
End: 2020-05-29

## 2020-06-02 ENCOUNTER — TELEPHONE (OUTPATIENT)
Dept: GASTROENTEROLOGY | Facility: CLINIC | Age: 55
End: 2020-06-02

## 2020-06-02 NOTE — TELEPHONE ENCOUNTER
LVM for patient in regards to scheduling procedure with Dr. Avery. Left direct line for patient to call to go over scheduling details.

## 2020-06-05 NOTE — TELEPHONE ENCOUNTER
LVM for patient in regards to scheduling procedure with Dr. Avery at [facility]. Left direct line for patient to call to go over scheduling details.

## 2020-06-08 ENCOUNTER — PATIENT OUTREACH (OUTPATIENT)
Dept: GASTROENTEROLOGY | Facility: CLINIC | Age: 55
End: 2020-06-08

## 2020-06-08 NOTE — LETTER
June 8, 2020    Solitario Lucia                              [address] [place] [phone]    Dear Solitario,    This letter is related to recommended follow up from Dr. Avery. He performed an Upper GI endoscopy related to Dobson's low grade dysplasia in June of 2019. Dr. Avery is recommending annual follow up, including a repeat procedure for continued surveillance.    We've tried to reach out several times by phone to discuss this procedure, but have been unable to reach you. Orders have been placed for this procedure. Please contact scheduling if you wish to follow up at [phone].    Daphne Harding, TRAY Care Coordinator

## 2020-06-08 NOTE — TELEPHONE ENCOUNTER
Reached out regarding recommended f/up per Dr. Avery    Procedure/Imaging/Clinic: EGD  Physician: Dr. Avery  Timing: June 2020  Procedure length:50  Anesthesia:general  Dx: Barretts  Tier:3  Location: [facility] message. Letter sent      Daphne Harding RN Care Coordinator

## 2020-06-12 ENCOUNTER — TELEPHONE (OUTPATIENT)
Dept: GASTROENTEROLOGY | Facility: CLINIC | Age: 55
End: 2020-06-12

## 2020-06-16 DIAGNOSIS — Z11.59 ENCOUNTER FOR SCREENING FOR OTHER VIRAL DISEASES: Primary | ICD-10-CM

## 2020-06-16 PROBLEM — K22.70 BARRETT'S ESOPHAGUS: Status: ACTIVE | Noted: 2020-06-16

## 2020-06-16 NOTE — TELEPHONE ENCOUNTER
Spoke to patient in regards to scheduled procedure. Informed patient he is scheduled with Dr. Avery on 6/29/2020. Informed patient he will need an updated pre-op physical within 30 days of his procedure. Patient stated he is going to have this done locally. Informed patient he will need a  and someone to monitor him for 24 hours after the procedure. Informed patient all scheduling details will be sent to the address listed on Epic. Address confirmed on this call.

## 2020-06-16 NOTE — TELEPHONE ENCOUNTER
LVM #2 for patient in regards to scheduling procedure with Dr. Avery. Left direct line for patient to call and scheduled.

## 2020-06-22 ENCOUNTER — PATIENT OUTREACH (OUTPATIENT)
Dept: GASTROENTEROLOGY | Facility: CLINIC | Age: 55
End: 2020-06-22

## 2020-06-22 NOTE — TELEPHONE ENCOUNTER
Returned patients call regarding preprocedure questions prior to procedure on 6/29. Left message noting need for preop and COVID test    Patient called back to state he had preop done on 5/29 (procedure scheduled for 6/29    Daphne Harding RN Care Coordinator

## 2020-06-24 ENCOUNTER — CARE COORDINATION (OUTPATIENT)
Dept: GASTROENTEROLOGY | Facility: CLINIC | Age: 55
End: 2020-06-24

## 2020-06-25 ENCOUNTER — PATIENT OUTREACH (OUTPATIENT)
Dept: GASTROENTEROLOGY | Facility: CLINIC | Age: 55
End: 2020-06-25

## 2020-06-25 ENCOUNTER — AMBULATORY - HEALTHEAST (OUTPATIENT)
Dept: FAMILY MEDICINE | Facility: CLINIC | Age: 55
End: 2020-06-25

## 2020-06-25 ENCOUNTER — MEDICAL CORRESPONDENCE (OUTPATIENT)
Dept: HEALTH INFORMATION MANAGEMENT | Facility: CLINIC | Age: 55
End: 2020-06-25

## 2020-06-25 DIAGNOSIS — Z11.59 ENCOUNTER FOR SCREENING FOR OTHER VIRAL DISEASES: ICD-10-CM

## 2020-06-25 NOTE — TELEPHONE ENCOUNTER
Called to update patient. [facility] insisting on signed orders, which are difficult to organize this close to procedure date. Patient says he can go to PCP. Will fax orders to PCP office.    Daphne Harding RN Care Coordinator

## 2020-06-25 NOTE — PROGRESS NOTES
Called patient regarding COVID test, PCP will not do for asymptomatic patients. Pt will go to [facility] in [place]    Daphne Harding RN Care Coordinator

## 2020-06-26 ENCOUNTER — TELEPHONE (OUTPATIENT)
Dept: GASTROENTEROLOGY | Facility: CLINIC | Age: 55
End: 2020-06-26

## 2020-06-26 ENCOUNTER — AMBULATORY - HEALTHEAST (OUTPATIENT)
Dept: FAMILY MEDICINE | Facility: CLINIC | Age: 55
End: 2020-06-26

## 2020-06-26 DIAGNOSIS — Z11.59 ENCOUNTER FOR SCREENING FOR OTHER VIRAL DISEASES: ICD-10-CM

## 2020-06-26 NOTE — TELEPHONE ENCOUNTER
Health Call Center    Phone Message    May a detailed message be left on voicemail: yes     Reason for Call: Other: [facility] at [facility] would like to know if Dr Avery would update this pts pre op and let them know if nothing has changed since the 5/29 pre op was done prior to the pts 6/29 surgery. Please call Zuleima at [phone] to let her know if Dr Avery is able to do this, thanks!     Action Taken: Message routed to:  [facility] Aaron Watson    Travel Screening: Not Applicable

## 2020-06-29 ENCOUNTER — ANESTHESIA EVENT (OUTPATIENT)
Dept: SURGERY | Facility: CLINIC | Age: 55
End: 2020-06-29
Payer: COMMERCIAL

## 2020-06-29 ENCOUNTER — HOSPITAL ENCOUNTER (OUTPATIENT)
Facility: CLINIC | Age: 55
Discharge: HOME | End: 2020-06-29
Attending: INTERNAL MEDICINE | Admitting: INTERNAL MEDICINE
Payer: COMMERCIAL

## 2020-06-29 ENCOUNTER — ANESTHESIA (OUTPATIENT)
Dept: SURGERY | Facility: CLINIC | Age: 55
End: 2020-06-29
Payer: COMMERCIAL

## 2020-06-29 VITALS
WEIGHT: 245.7 LBS | OXYGEN SATURATION: 97 % | RESPIRATION RATE: 11 BRPM | HEART RATE: 59 BPM | TEMPERATURE: 97.3 F | HEIGHT: 74 IN | DIASTOLIC BLOOD PRESSURE: 82 MMHG | SYSTOLIC BLOOD PRESSURE: 122 MMHG | BODY MASS INDEX: 31.53 KG/M2

## 2020-06-29 DIAGNOSIS — K22.70 BARRETT'S ESOPHAGUS: ICD-10-CM

## 2020-06-29 LAB — UPPER GI ENDOSCOPY: NORMAL

## 2020-06-29 PROCEDURE — 36000052 ZZH SURGERY LEVEL 2 EA 15 ADDTL MIN: Performed by: INTERNAL MEDICINE

## 2020-06-29 PROCEDURE — 40000170 ZZH STATISTIC PRE-PROCEDURE ASSESSMENT II: Performed by: INTERNAL MEDICINE

## 2020-06-29 PROCEDURE — 88305 TISSUE EXAM BY PATHOLOGIST: CPT | Performed by: INTERNAL MEDICINE

## 2020-06-29 PROCEDURE — 25800030 ZZH RX IP 258 OP 636: Performed by: NURSE ANESTHETIST, CERTIFIED REGISTERED

## 2020-06-29 PROCEDURE — 71000027 ZZH RECOVERY PHASE 2 EACH 15 MINS: Performed by: INTERNAL MEDICINE

## 2020-06-29 PROCEDURE — 25000125 ZZHC RX 250: Performed by: NURSE ANESTHETIST, CERTIFIED REGISTERED

## 2020-06-29 PROCEDURE — 88305 TISSUE EXAM BY PATHOLOGIST: CPT | Mod: 26 | Performed by: INTERNAL MEDICINE

## 2020-06-29 PROCEDURE — 88305 TISSUE EXAM BY PATHOLOGIST: CPT | Mod: 26 | Performed by: PATHOLOGY

## 2020-06-29 PROCEDURE — 36000050 ZZH SURGERY LEVEL 2 1ST 30 MIN: Performed by: INTERNAL MEDICINE

## 2020-06-29 PROCEDURE — 37000008 ZZH ANESTHESIA TECHNICAL FEE, 1ST 30 MIN: Performed by: INTERNAL MEDICINE

## 2020-06-29 PROCEDURE — 25000128 H RX IP 250 OP 636: Performed by: NURSE ANESTHETIST, CERTIFIED REGISTERED

## 2020-06-29 PROCEDURE — 37000009 ZZH ANESTHESIA TECHNICAL FEE, EACH ADDTL 15 MIN: Performed by: INTERNAL MEDICINE

## 2020-06-29 PROCEDURE — 71000012 ZZH RECOVERY PHASE 1 LEVEL 1 FIRST HR: Performed by: INTERNAL MEDICINE

## 2020-06-29 RX ORDER — ONDANSETRON 2 MG/ML
INJECTION INTRAMUSCULAR; INTRAVENOUS PRN
Status: DISCONTINUED | OUTPATIENT
Start: 2020-06-29 | End: 2020-06-29

## 2020-06-29 RX ORDER — DEXAMETHASONE SODIUM PHOSPHATE 4 MG/ML
INJECTION, SOLUTION INTRA-ARTICULAR; INTRALESIONAL; INTRAMUSCULAR; INTRAVENOUS; SOFT TISSUE PRN
Status: DISCONTINUED | OUTPATIENT
Start: 2020-06-29 | End: 2020-06-29

## 2020-06-29 RX ORDER — ONDANSETRON 2 MG/ML
4 INJECTION INTRAMUSCULAR; INTRAVENOUS
Status: DISCONTINUED | OUTPATIENT
Start: 2020-06-29 | End: 2020-06-29 | Stop reason: HOSPADM

## 2020-06-29 RX ORDER — SODIUM CHLORIDE, SODIUM LACTATE, POTASSIUM CHLORIDE, CALCIUM CHLORIDE 600; 310; 30; 20 MG/100ML; MG/100ML; MG/100ML; MG/100ML
INJECTION, SOLUTION INTRAVENOUS CONTINUOUS PRN
Status: DISCONTINUED | OUTPATIENT
Start: 2020-06-29 | End: 2020-06-29

## 2020-06-29 RX ORDER — LIDOCAINE 40 MG/G
CREAM TOPICAL
Status: DISCONTINUED | OUTPATIENT
Start: 2020-06-29 | End: 2020-06-29 | Stop reason: HOSPADM

## 2020-06-29 RX ORDER — FENTANYL CITRATE 50 UG/ML
INJECTION, SOLUTION INTRAMUSCULAR; INTRAVENOUS PRN
Status: DISCONTINUED | OUTPATIENT
Start: 2020-06-29 | End: 2020-06-29

## 2020-06-29 RX ORDER — PROPOFOL 10 MG/ML
INJECTION, EMULSION INTRAVENOUS CONTINUOUS PRN
Status: DISCONTINUED | OUTPATIENT
Start: 2020-06-29 | End: 2020-06-29

## 2020-06-29 RX ORDER — LIDOCAINE HYDROCHLORIDE 20 MG/ML
INJECTION, SOLUTION INFILTRATION; PERINEURAL PRN
Status: DISCONTINUED | OUTPATIENT
Start: 2020-06-29 | End: 2020-06-29

## 2020-06-29 RX ADMIN — LIDOCAINE HYDROCHLORIDE 40 MG: 20 INJECTION, SOLUTION INFILTRATION; PERINEURAL at 11:41

## 2020-06-29 RX ADMIN — MIDAZOLAM 2 MG: 1 INJECTION INTRAMUSCULAR; INTRAVENOUS at 11:41

## 2020-06-29 RX ADMIN — DEXAMETHASONE SODIUM PHOSPHATE 4 MG: 4 INJECTION, SOLUTION INTRA-ARTICULAR; INTRALESIONAL; INTRAMUSCULAR; INTRAVENOUS; SOFT TISSUE at 12:03

## 2020-06-29 RX ADMIN — FENTANYL CITRATE 50 MCG: 50 INJECTION, SOLUTION INTRAMUSCULAR; INTRAVENOUS at 11:41

## 2020-06-29 RX ADMIN — ONDANSETRON 4 MG: 2 INJECTION INTRAMUSCULAR; INTRAVENOUS at 11:55

## 2020-06-29 RX ADMIN — PROPOFOL 300 MCG/KG/MIN: 10 INJECTION, EMULSION INTRAVENOUS at 11:45

## 2020-06-29 RX ADMIN — SODIUM CHLORIDE, POTASSIUM CHLORIDE, SODIUM LACTATE AND CALCIUM CHLORIDE: 600; 310; 30; 20 INJECTION, SOLUTION INTRAVENOUS at 11:41

## 2020-06-29 ASSESSMENT — ENCOUNTER SYMPTOMS
SEIZURES: 0
DYSRHYTHMIAS: 0

## 2020-06-29 ASSESSMENT — MIFFLIN-ST. JEOR: SCORE: 2034.24

## 2020-06-29 ASSESSMENT — LIFESTYLE VARIABLES: TOBACCO_USE: 0

## 2020-06-29 NOTE — ANESTHESIA POSTPROCEDURE EVALUATION
Patient: Solitario Lucia    Procedure(s):  ESOPHAGOGASTRODUODENOSCOPY (EGD), with biopsy    Diagnosis:Dobson's esophagus [K22.70]  Diagnosis Additional Information: No value filed.    Anesthesia Type:  MAC    Note:  Anesthesia Post Evaluation    Patient location during evaluation: PACU  Patient participation: Able to fully participate in evaluation  Level of consciousness: awake and alert  Pain management: adequate  Airway patency: patent  Cardiovascular status: acceptable, hemodynamically stable and blood pressure returned to baseline  Respiratory status: acceptable  Hydration status: acceptable  PONV: none     Anesthetic complications: None          Last vitals:  Vitals:    06/29/20 1123 06/29/20 1214 06/29/20 1230   BP: 138/85 117/79 122/82   Pulse: 64 59    Resp: 12 16 11   Temp: 36.7  C (98  F) 36.1  C (96.9  F) 36.3  C (97.3  F)   SpO2: 98% 98% 97%         Electronically Signed By: Coby Anthony MD  June 29, 2020  1:11 PM

## 2020-06-29 NOTE — DISCHARGE INSTRUCTIONS
Same Day Surgery Discharge Instructions for  Sedation and General Anesthesia       It's not unusual to feel dizzy, light-headed or faint for up to 24 hours after surgery or while taking pain medication.  If you have these symptoms: sit for a few minutes before standing and have someone assist you when you get up to walk or use the bathroom.      You should rest and relax for the next 24 hours. We recommend you make arrangements to have an adult stay with you for at least 24 hours after your discharge.  Avoid hazardous and strenuous activity.      DO NOT DRIVE any vehicle or operate mechanical equipment for 24 hours following the end of your surgery.  Even though you may feel normal, your reactions may be affected by the medication you have received.      Do not drink alcoholic beverages for 24 hours following surgery.       Slowly progress to your regular diet as you feel able. It's not unusual to feel nauseated and/or vomit after receiving anesthesia.  If you develop these symptoms, drink clear liquids (apple juice, ginger ale, broth, 7-up, etc. ) until you feel better.  If your nausea and vomiting persists for 24 hours, please notify your surgeon.        All narcotic pain medications, along with inactivity and anesthesia, can cause constipation. Drinking plenty of liquids and increasing fiber intake will help.      For any questions of a medical nature, call your surgeon.      Do not make important decisions for 24 hours.      If you had general anesthesia, you may have a sore throat for a couple of days related to the breathing tube used during surgery.  You may use Cepacol lozenges to help with this discomfort.  If it worsens or if you develop a fever, contact your surgeon.       If you feel your pain is not well managed with the pain medications prescribed by your surgeon, please contact your surgeon's office to let them know so they can address your concerns.       CoVid 19 Information    We want to give you  information regarding Covid. Please consult your primary care provider with any questions you might have.     Patient who have symptoms (cough, fever, or shortness of breath), need to isolate for 7 days from when symptoms started OR 72 hours after fever resolves (without fever reducing medications) AND improvement of respiratory symptoms (whichever is longer).      Isolate yourself at home (in own room/own bathroom if possible)    Do Not allow any visitors    Do Not go to work or school    Do Not go to Islam,  centers, shopping, or other public places.    Do Not shake hands.    Avoid close and intimate contact with others (hugging, kissing).    Follow CDC recommendations for household cleaning of frequently touched services.     After the initial 7 days, continue to isolate yourself from household members as much as possible. To continue decrease the risk of community spread and exposure, you and any members of your household should limit activities in public for 14 days after starting home isolation.     You can reference the following CDC link for helpful home isolation/care tips:  https://www.cdc.gov/coronavirus/2019-ncov/downloads/10Things.pdf    Protect Others:    Cover Your Mouth and Nose with a mask, disposable tissue or wash cloth to avoid spreading germs to others.    Wash your hands and face frequently with soap and water    Call Your Primary Doctor If: Breathing difficulty develops or you become worse.    For more information about COVID19 and options for caring for yourself at home, please visit the CDC website at https://www.cdc.gov/coronavirus/2019-ncov/about/steps-when-sick.html  For more options for care at [facility], please visit our website at [email or web address]      Dr. Avery will contact you when he has biopsy results, follow-up to be determined based on those results.    **If you have questions or concerns about your procedure,   call   Bennie at [phone]**

## 2020-06-29 NOTE — OR NURSING
EGD note:  Procedure in OR 24.  EGD with esophgeal biopsies. Specimen left with circulator for processing.  See MD report for details.

## 2020-06-29 NOTE — ANESTHESIA CARE TRANSFER NOTE
Patient: Solitario Lucia    Procedure(s):  ESOPHAGOGASTRODUODENOSCOPY (EGD), with biopsy    Diagnosis: Dobson's esophagus [K22.70]  Diagnosis Additional Information: No value filed.    Anesthesia Type:   MAC     Note:  Airway :Nasal Cannula  Patient transferred to:Phase II  Handoff Report: Identifed the Patient, Identified the Reponsible Provider, Reviewed the pertinent medical history, Discussed the surgical course, Reviewed Intra-OP anesthesia mangement and issues during anesthesia, Set expectations for post-procedure period and Allowed opportunity for questions and acknowledgement of understanding      Vitals: (Last set prior to Anesthesia Care Transfer)  /79  Pulse 61  Temp 36.1  RR 12  Sats 97  CRNA VITALS  6/29/2020 1142 - 6/29/2020 1217      6/29/2020             NIBP:  115/70    NIBP Mean:  84                Electronically Signed By: RYNE Avila CRNA  June 29, 2020  12:17 PM

## 2020-06-29 NOTE — ANESTHESIA PREPROCEDURE EVALUATION
Anesthesia Pre-Procedure Evaluation    Patient: Solitario Lucia   MRN: [id number] : 1965          Preoperative Diagnosis: Dobson's esophagus [K22.70]    Procedure(s):  ESOPHAGOGASTRODUODENOSCOPY (EGD)    Past Medical History:   Diagnosis Date     Anaplasmosis      Anxiety      Aseptic meningitis      Cervical disc herniation      CLL (chronic lymphocytic leukemia) (H)      Colitis      Esophageal reflux      Persistent lymphocytosis      Terminal ileitis (H)      Past Surgical History:   Procedure Laterality Date     ESOPHAGOSCOPY, GASTROSCOPY, DUODENOSCOPY (EGD) WITH RADIO FREQUENCY ABLATION, COMBINED N/A 2018    Procedure: COMBINED ESOPHAGOSCOPY, GASTROSCOPY, DUODENOSCOPY (EGD) WITH RADIO FREQUENCY ABLATION;  Esophagogastroduodenoscopy With Radio Frequency Ablation ;  Surgeon: Abilio Avery MD;  Location: [facility] OR     ESOPHAGOSCOPY, GASTROSCOPY, DUODENOSCOPY (EGD) WITH RADIO FREQUENCY ABLATION, COMBINED N/A 2018    Procedure: Upper Endoscopy;  Surgeon: Abilio Avery MD;  Location: [facility] OR     ESOPHAGOSCOPY, GASTROSCOPY, DUODENOSCOPY (EGD), COMBINED N/A 2019    Procedure: Esophagogastroduodenoscopy, Upper Endoscopy with Biopsy;  Surgeon: Abilio Avery MD;  Location: [facility] OR     HERNIA REPAIR       Allergies   Allergen Reactions     No Clinical Screening - See Comments      Sunscreen, scented rash     Prior to Admission medications    Medication Sig Start Date End Date Taking? Authorizing Provider   acyclovir (ZOVIRAX) 400 MG tablet daily  17   Reported, Patient   esomeprazole (NEXIUM) 40 MG DR capsule Take 40 mg by mouth 2 times daily (before meals) Take 30-60 minutes before eating.    Reported, Patient   FLUoxetine (PROZAC) 20 MG capsule Take 40 mg by mouth daily     Reported, Patient   guaiFENesin (MUCINEX) 600 MG 12 hr tablet Take 600 mg by mouth 2 times daily    Reported, Patient   Ibuprofen (ADVIL PO) Take by mouth every 4 hours as needed for moderate pain     Reported, Patient   Multiple Vitamin (MULTI-VITAMINS) TABS  9/16/13   Reported, Patient   OMEGA-3 FATTY ACIDS-VITAMIN E PO Take 1 capsule by mouth daily  9/16/13   Reported, Patient   omeprazole (PRILOSEC) 20 MG DR capsule Take 20 mg by mouth daily    Reported, Patient   Turmeric 400 MG CAPS Take 400 mg by mouth daily    Reported, Patient       Anesthesia Evaluation     .             ROS/MED HX    ENT/Pulmonary:      (-) tobacco use, asthma and sleep apnea   Neurologic:      (-) seizures and migraines   Cardiovascular:        (-) hypertension, CAD, GUZMAN, arrhythmias, valvular problems/murmurs and dyslipidemia   METS/Exercise Tolerance:  >4 METS   Hematologic:        (-) history of blood clots, anemia and other hematologic disorder   Musculoskeletal:        (-) arthritis   GI/Hepatic:     (+) GERD (barretts )      (-) liver disease   Renal/Genitourinary:         Endo:      (-) Type I DM, Type II DM, thyroid disease and obesity   Psychiatric:     (+) psychiatric history depression      Infectious Disease:        (-) Recent Fever   Malignancy:   (+) Malignancy History of Lymphoma/Leukemia  Lymph CA Active status post Chemo,         Other:                          Physical Exam  Normal systems: cardiovascular, pulmonary and dental    Airway   Mallampati: II  TM distance: >3 FB  Neck ROM: full    Dental     Cardiovascular   Rhythm and rate: regular and normal      Pulmonary    breath sounds clear to auscultation            Lab Results   Component Value Date    WBC 25.4 (H) 12/17/2018    HGB 14.6 06/24/2019    HCT 48.7 12/17/2018     (L) 12/17/2018     06/24/2019    POTASSIUM 3.8 06/24/2019    CHLORIDE 106 06/24/2019    CO2 24 06/24/2019    BUN 14 06/24/2019    CR 0.96 06/24/2019    GLC 93 06/24/2019    ROBIN 8.7 06/24/2019    INR 0.99 12/17/2018       Preop Vitals  BP Readings from Last 3 Encounters:   06/24/19 110/70   12/17/18 142/87   07/02/18 120/88    Pulse Readings from Last 3 Encounters:   06/24/19 51  "  12/17/18 67   07/02/18 62      Resp Readings from Last 3 Encounters:   06/24/19 16   12/17/18 18   07/02/18 16    SpO2 Readings from Last 3 Encounters:   06/24/19 96%   12/17/18 94%   07/02/18 98%      Temp Readings from Last 1 Encounters:   06/24/19 36.9  C (98.5  F) (Oral)    Ht Readings from Last 1 Encounters:   06/24/19 1.854 m (6' 1\")      Wt Readings from Last 1 Encounters:   06/24/19 112 kg (246 lb 14.6 oz)    Estimated body mass index is 32.58 kg/m  as calculated from the following:    Height as of 6/24/19: 1.854 m (6' 1\").    Weight as of 6/24/19: 112 kg (246 lb 14.6 oz).       Anesthesia Plan      History & Physical Review      ASA Status:  2 .    NPO Status:  > 8 hours    Plan for MAC Reason for MAC:  Deep or markedly invasive procedure (G8)  PONV prophylaxis:  Ondansetron (or other 5HT-3)         Postoperative Care  Postoperative pain management:  Multi-modal analgesia.      Consents  Anesthetic plan, risks, benefits and alternatives discussed with:  Patient..                 Coby Anthony MD  "

## 2020-06-29 NOTE — LETTER
Patient:  Solitario Lucia  :   1965  MRN:     [id number]        Mr.Timothy FELIX Lucia  [address] [place] [id number]        2020    Dear ,    We are writing to inform you of your test results. In short, great news. Biopsies again had no evidence Barretts suggesting that we eradicated those cells with RFA. As discussed previously, our plan includes graduating you to one of our esohagologists so that you may further review your reflux symptoms.     I have included the formal documtentation of the results below. It continues to be a pleasure participating in your care.  Please feel free to contact our clinic with any further questions.      Sincerely,    Abilio Guerra MD PhD EDMNUDOG AISSATOU CRISOSTOMO  Director of Endoscopy  Associate Professor of Medicine, Surgery and Pediatrics  Interventional and Therapeutic Endoscopy    [facility]  Division of Gastroenterology and Hepatology  [facility] [address] [address]    New Consultations  [phone]  Procedure Scheduling [phone]  Clinical Nurse Coordinator [phone]  Clinical Fax   [phone]  Administrative   [phone]  Administrative Fax  [phone]        Resulted Orders   Surgical pathology exam   Result Value Ref Range    Copath Report       Patient Name: SOLITARIO LUCIA  MR#: [id number]  Specimen #: [id number]  Collected: 2020  Received: 2020  Reported: 2020 10:39  Ordering Phy(s): ABILIO GUERRA    For improved result formatting, select 'View Enhanced Report Format' under   Linked Documents section.    SPECIMEN(S):  Gastroesophageal junction biopsy    FINAL DIAGNOSIS:  Gastroesophageal junction: Biopsy:  - Squamocolumnar and columnar mucosa with chronic inflammation and   reactive epithelial changes  - No intestinal metaplasia or dysplasia    Electronically signed out by:    Rylan Franklin M.D.    CLINICAL HISTORY:  Dobson esophagus    GROSS:  The specimen  "is received in formalin with the proper patient information,   labeled \"gastroesophageal junction\".  The specimen consists of 4 tan mucosal fragments, 0.1-0.3 cm, entirely   submitted in one cassette. (Dictated  by: Reji Bardales 6/30/2020 07:49 AM)    MICROSCOPIC:  The microscopic findings substantiates the final diagnosis.    The technical compon ent of this testing was completed at the [facility], with the professional component performed   at the [facility]  [facility], [address] [place] [address]  [phone] ([phone])    CPT Codes:  A: 03365-VB6    COLLECTION SITE:  Client: [facility]  Location: [facility] (S)             "

## 2020-07-01 LAB — COPATH REPORT: NORMAL

## 2020-07-16 ENCOUNTER — PATIENT OUTREACH (OUTPATIENT)
Dept: GASTROENTEROLOGY | Facility: CLINIC | Age: 55
End: 2020-07-16

## 2020-07-16 NOTE — PROGRESS NOTES
Reached out to pt to set up care in esophageal clinic with Dr. Guy per Dr. Avery. Pt set up for 5/24/21

## 2021-04-10 NOTE — TELEPHONE ENCOUNTER
REFERRAL INFORMATION:    Referring Provider:  Dr. Avery    Referring Clinic:  [facility]     Reason for Visit/Diagnosis: Dobson's     FUTURE VISIT INFORMATION:    Appointment Date: 5/24/2021    Appointment Time: 3:40 PM      NOTES STATUS DETAILS   OFFICE NOTE from Referring Provider Internal 6/21/18 Office visit with Dr. Avery      OFFICE NOTE from Other Specialist Care Everywhere 12/4/19 Office visit with RYNE Turner CNP ([facility])      HOSPITAL DISCHARGE SUMMARY/  ED VISITS Care Everywhere 12/17/2020 ([place])    OPERATIVE REPORT N/A    MEDICATION LIST Internal         ENDOSCOPY  Internal EGD: 6/29/2020, 6/24/19, 12/17/18, 7/2/18   COLONOSCOPY N/A    ERCP N/A    EUS N/A    STOOL TESTING N/A    PERTINENT LABS Internal/ Care Everywhere    PATHOLOGY REPORTS (RELATED) Internal 6/29/2020, 6/24/19,    IMAGING (CT, MRI, EGD, MRCP, Small Bowel Follow Through/SBT, MR/CT Enterography) Care Everywhere [facility]:  - CT Abdomen Pelvis: 3/15/2021, 3/12/2020  - CT Neck: 3/12/2020, 12/5/19  - US Head Neck: 7/3/19     5/13/2021 2:19pm Fax request sent to [facility] for images. -Edgar     5/20/2021 3:01pm Fax request sent to [facility] for images. -Edgar

## 2021-05-24 ENCOUNTER — PRE VISIT (OUTPATIENT)
Dept: GASTROENTEROLOGY | Facility: CLINIC | Age: 56
End: 2021-05-24

## 2021-05-30 ENCOUNTER — HEALTH MAINTENANCE LETTER (OUTPATIENT)
Age: 56
End: 2021-05-30

## 2021-06-18 NOTE — OP NOTE
----- Message from Chema Payne PA-C sent at 6/18/2021  7:44 AM CDT -----  Palmar erythema is still most likely secondary to alcohol use.  Vitamin-D level is little low, she can take 2000 units daily at 23.  We should recheck that in 3 months.  Scleroderma a IgG was borderline positive.  Would prefer a 2nd opinion with dermatology given her history and symptoms.  Thank you   Upper GI Endoscopy  06/29/2020 11:49 AM  Jefry Hamlts    [facility] Endoscopy Department   _______________________________________________________________________________   Patient Name: Solitario Lucia           Procedure Date: 6/29/2020 11:49 AM   MRN: [id number]                       Account Number: [id number]   YOB: 1965             Admit Type: Outpatient   Age: 54                               Room: OR   Note Status: Finalized                Attending MD: Abilio Avery MD   Total Sedation Time:                  Instrument Name: 401 GIF- Gastroscope   _______________________________________________________________________________       Procedure:                Upper GI endoscopy   Indications:              Surveillance for malignancy due to personal history                             of Dobson's esophagus   Providers:                Abilio Avery MD, Roseline Mccartney RN   Patient Profile:          Mr Lucia is a 55yo gentleman with a history of low                             grade dysplastic Barretts status post RFA therapy                             who returns for surveillance following a previous                             endoscopy one year back suggesting erradication.   Referring MD:             Grover Grijalva MD   Medicines:                Deep sedation was administered   Complications:            No immediate complications.   _______________________________________________________________________________   Procedure:                Pre-Anesthesia Assessment:                             - Prior to the procedure, a History and Physical                             was performed, and patient medications and                             allergies were reviewed. The patient is competent.                             The risks and benefits of the procedure and the                             sedation options and risks were discussed with the                              patient. All questions were answered and informed                             consent was obtained. Patient identification and                             proposed procedure were verified by the nurse in                             the pre-procedure area. Mental Status Examination:                             alert and oriented. Airway Examination: Mallampati                             Class II (the uvula but not tonsillar pillars                             visualized). Respiratory Examination: clear to                             auscultation. CV Examination: normal. ASA Grade                             Assessment: I - A normal, healthy patient. After                             reviewing the risks and benefits, the patient was                             deemed in satisfactory condition to undergo the                             procedure. The anesthesia plan was to use deep                             sedation / analgesia. Immediately prior to                             administration of medications, the patient was                             re-assessed for adequacy to receive sedatives. The                             heart rate, respiratory rate, oxygen saturations,                             blood pressure, adequacy of pulmonary ventilation,                             and response to care were monitored throughout the                             procedure. The physical status of the patient was                             re-assessed after the procedure. After obtaining                             informed consent, the endoscope was passed under                             direct vision. Throughout the procedure, the                             patient's blood pressure, pulse, and oxygen                             saturations were monitored continuously. The                             gastroscope was introduced through the mouth, and                             advanced to the second part of  duodenum. The upper                             GI endoscopy was accomplished without difficulty.                             The patient tolerated the procedure well.                                                                                    Findings:        The proximal, mid and distal esophagus were unremarkable with the        squamocolumnar line seen without signficant irregularity at the        gastroesophageal junction. This included evaluation by narrow band.        There were no raised or depressed lesions nor was there a hiatal hernia        appreciated. The cardia, fundus, body and antrum were unremarkable as        were the bulb and proximal sweep. Four quarant biopsies were obtained        form the gastroesophageal junction to ensure absence of metaplasia and        dysplasia.                                                                                     Impression:               - Grossly unremarkable white light evaluation of                             the foregut without persistent endoscopic evidence                             of Barretts; surveillance biopsies pending   Recommendation:           - Deep sedation recovery with probable discharge                             home this afternoon                             - We will refer to one of our esophagologists for                             follow up and ongoing surveillance (Brooks, Gregorio,                             or Shay)                             - Dr Avery to communicate the results of                             histology when available; if biopsies again return                             without Barretts a decision for repeat evaluation                             in 3y or by clinical course will be made                             - The findings and recommendations were discussed                             with the patient and their family

## 2021-08-17 ENCOUNTER — OFFICE VISIT (OUTPATIENT)
Dept: GASTROENTEROLOGY | Facility: CLINIC | Age: 56
End: 2021-08-17
Payer: COMMERCIAL

## 2021-08-17 VITALS
BODY MASS INDEX: 32.64 KG/M2 | SYSTOLIC BLOOD PRESSURE: 140 MMHG | DIASTOLIC BLOOD PRESSURE: 87 MMHG | WEIGHT: 254.2 LBS | HEART RATE: 64 BPM

## 2021-08-17 DIAGNOSIS — K22.719 BARRETT'S ESOPHAGUS WITH DYSPLASIA: Primary | ICD-10-CM

## 2021-08-17 PROCEDURE — 99204 OFFICE O/P NEW MOD 45 MIN: CPT | Performed by: INTERNAL MEDICINE

## 2021-08-17 NOTE — NURSING NOTE
Chief Complaint   Patient presents with     New Patient       Vitals:    08/17/21 1456   BP: (!) 140/87   Pulse: 64   Weight: 115.3 kg (254 lb 3.2 oz)       Body mass index is 32.64 kg/m .    Nneka Gant CMA

## 2021-08-17 NOTE — LETTER
8/17/2021         RE: Solitario Lucia  [address] [facility] [address]        Dear Colleague,    Thank you for referring your patient, Solitario Lucia, to the [facility] [place]. Please see a copy of my visit note below.    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  After the first session appeared to be complete eradication of Dobson's.  Repeat exams in 2019 and 2020 with biopsies did not reveal any Dobson's esophagus.  The patient had his last upper endoscopy with biopsies June 2020.  The patient has a sore throat feels like it hurts around his voice box affecting his voice has a history of some vocal dysfunction in the past and has seen ENT.      Recommendations:   EGD with biopsies  Recommendations for future surveillance to follow based on those results  ENT consultation for sore throat and vocal dysfunction present (the patient will see his own ENT physician as he has seen them in the past).    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  After the first session appeared to be complete eradication of Dobson's.  Repeat exams in 2019 and 2020 with biopsies did not reveal any Dobson's esophagus.  The patient had his last upper endoscopy with biopsies June 2020.    Vis-à-vis acid reflux symptoms he is doing okay he does have a pain up in his throat that affect his voice.  He has had this in the past and has been evaluated by an ear nose and throat doctor.  It is never really been clear what this is and  whether or not it is related to acid reflux.  Patient does maintain is omeprazole 40 mg p.o. twice daily as a regimen.  He was treated in 2019 for chronic lymphocytic leukemia with advanced changes including bulky lymphadenopathy.  He received biologic therapy and chemotherapy.  This was given to him at [facility] in [place].  He currently denies lymphadenopathy, losing weight, fevers, chills, or night sweats or other sweats, fatigue, shortness of breath.  He denies dysphagia.    EGD with ablation  7/2/2018  Impression:          - C0M1 Barretts esophagus (biopsy proven with low grade                        dysplasia) without concern concomitant lesion was                        treated with radiofrequncy ablation as detailed above                        - Otherwise unremarkable foregut endoscopy   Recommendation:      - Standard outpatient general anesthesia recovery with                        probable discharge home this afternoon                        - Repeat upper endoscopy with repeat therapy or sampling                        (pending findings) in 12 weeks                        - Continue acid suppression and behavior modification as                        discussed in clinic                        - The findings and recommendations were discussed with                        the patient and their family                                                                                     EGD with ablation 12/17/2018  Impression:          - Apparent complete endoscopic response with only an                        irregular esophagogastric line demonstrated and                        retreated with radiofrequency ablation as above                        - Otherwise unremarkable foregut endoscopy   Recommendation:      - Standard outpatient deep sedation recovery with                        probable discharge home this morning                        - Continue reflux precautions and medications (PPI) as                         well as other lifestyle modifications                        - Liquids for the next 36h followed by slow progression                        to small meal diet                        - Chest pain expected over the next 24h; this should not                        be progressive or associated with shortness of breath;                        we will prescribe viscous lidocaine to be taken PRN                        - Repeat EGD in 3m with plan for resampling                        - The findings and recommendations were discussed with                        the patient and their family                                                                                     EGD 6/25/2019  Impression:          - Endoscopic evidence to suggest resolved Barretts                        esophagus without inflammation or lesion, now sampled                        for pathology verification   Recommendation:      - Deep sedation recovery with probable discharge home                        this morning; diet to resume as usual as soon as                        anethesia resolves                        - Continue once daily PPI for the time being                        - Timing of surveillance EGD to be determined by                        pathology (residual dysplasia in three months for                        retreatment vs Barretts without dysplasia nor no                        Barretts in one year)                        - The findings and recommendations were discussed with                        the patient and their family   Path 6/25/2019  A: Gastroesophageal junction biopsy. 40cm   B: Gastroesophageal junction biopsy. 39cm     FINAL DIAGNOSIS:   A. GASTROESOPHAGEAL JUNCTION, 40CM, BIOPSY:   - Squamous and gastric type epithelium with mild chronic inflammation   - Negative for intestinal metaplasia or dysplasia       EGD 6/29/2020                                                                                 Impression:               - Grossly unremarkable white light evaluation of                             the foregut without persistent endoscopic evidence                             of Barretts; surveillance biopsies pending   Recommendation:           - Deep sedation recovery with probable discharge                             home this afternoon                             - We will refer to one of our esophagologists for                             follow up and ongoing surveillance (Gregorio Guy,                             or Shay)                             - Dr Avery to communicate the results of                             histology when available; if biopsies again return                             without Barretts a decision for repeat evaluation                             in 3y or by clinical course will be made                             - The findings and recommendations were discussed                             with the patient and their family   Path 6/29/2020  SPECIMEN(S):   Gastroesophageal junction biopsy     FINAL DIAGNOSIS:   Gastroesophageal junction: Biopsy:   - Squamocolumnar and columnar mucosa with chronic inflammation and   reactive epithelial changes   - No intestinal metaplasia or dysplasia     Current Outpatient Medications   Medication Sig Dispense Refill     acyclovir (ZOVIRAX) 400 MG tablet daily        esomeprazole (NEXIUM) 40 MG DR capsule Take 40 mg by mouth 2 times daily (before meals) Take 30-60 minutes before eating.       FLUoxetine (PROZAC) 20 MG capsule Take 40 mg by mouth daily        guaiFENesin (MUCINEX) 600 MG 12 hr tablet Take 600 mg by mouth 2 times daily       Ibuprofen (ADVIL PO) Take by mouth every 4 hours as needed for moderate pain       Multiple Vitamin (MULTI-VITAMINS) TABS        OMEGA-3 FATTY ACIDS-VITAMIN E PO Take 1 capsule by mouth daily        Turmeric 400 MG CAPS Take 400 mg by mouth daily         Past Surgical History:   Procedure Laterality  Date     ESOPHAGOSCOPY, GASTROSCOPY, DUODENOSCOPY (EGD) WITH RADIO FREQUENCY ABLATION, COMBINED N/A 7/2/2018    Procedure: COMBINED ESOPHAGOSCOPY, GASTROSCOPY, DUODENOSCOPY (EGD) WITH RADIO FREQUENCY ABLATION;  Esophagogastroduodenoscopy With Radio Frequency Ablation ;  Surgeon: Abilio Avery MD;  Location: [facility] OR     ESOPHAGOSCOPY, GASTROSCOPY, DUODENOSCOPY (EGD) WITH RADIO FREQUENCY ABLATION, COMBINED N/A 12/17/2018    Procedure: Upper Endoscopy;  Surgeon: Abilio Avery MD;  Location: [facility] OR     ESOPHAGOSCOPY, GASTROSCOPY, DUODENOSCOPY (EGD), COMBINED N/A 6/24/2019    Procedure: Esophagogastroduodenoscopy, Upper Endoscopy with Biopsy;  Surgeon: Abilio Avery MD;  Location: [facility] OR     ESOPHAGOSCOPY, GASTROSCOPY, DUODENOSCOPY (EGD), COMBINED N/A 6/29/2020    Procedure: ESOPHAGOGASTRODUODENOSCOPY (EGD), with biopsy;  Surgeon: Abilio Avery MD;  Location: [facility] OR     HERNIA REPAIR         Past Medical History:   Diagnosis Date     Anaplasmosis      Anxiety      Aseptic meningitis      Cervical disc herniation      CLL (chronic lymphocytic leukemia) (H)      Colitis      Esophageal reflux      Persistent lymphocytosis      Terminal ileitis (H)        No family history on file.     reports that he quit smoking about 20 years ago. He has quit using smokeless tobacco. He reports current alcohol use.    Physical Exam  Constitutional:       Appearance: Normal appearance.   HENT:      Head: Normocephalic.   Eyes:      Extraocular Movements: Extraocular movements intact.   Pulmonary:      Effort: Pulmonary effort is normal.   Abdominal:      General: Abdomen is flat.   Musculoskeletal:         General: Normal range of motion.   Skin:     General: Skin is warm and dry.   Neurological:      Mental Status: He is alert.          Again, thank you for allowing me to participate in the care of your patient.      Sincerely,    Grover Day MD

## 2021-08-17 NOTE — PATIENT INSTRUCTIONS
It was a pleasure taking care of you today. I've included a brief summary of our discussion and care plan from today's visit below.  Please review this information with your primary care provider.  _______________________________________________________________________    My recommendations are summarized as follows:  Recommendations:   EGD with biopsies  Recommendations for future surveillance to follow based on those results  ENT consultation for sore throat and vocal dysfunction present (the patient will see his own ENT physician as he has seen them in the past).    Return to GI Clinic in 6 weeks to review your progress.     If you need any follow-up appointments, please use the following phone numbers below.    To schedule or reschedule a follow-up GI appointment, call [phone] option 1    To schedule your endoscopy procedure, call [phone] option 2    To schedule imaging, please call [phone]     To schedule your lab appointments (at [facility] only) call [phone]. Call your [facility] [place] lab directly if you do not use the [facility] Lab. If you use a non-[facility] [place] lab, please let us know where to fax your lab order (call Kourtney at [phone]).      _______________________________________________________________________    Please be in touch if there are any further questions that arise following today's visit.  There are multiple ways to contact your gastroenterology care team.      During business hours, you may reach your gastroenterology RN Care Coordinator, Kourtney Lawson, at [phone].      You can always send a secure message through zoomsquare. [employer]oms[employer] messages are answered by your nurse or doctor typically within 24 hours. Please allow extra time on weekends and holidays.     What is zoomsquare?  MyChart is a secure way for you to access all of your healthcare records from the [facility].  It is a web based computer program, so you can sign on to it from any location.  It also allows you to  send secure messages to your care team.  I recommend signing up for [employer] access if you have not already done so and are comfortable with using a computer.     For urgent/emergent questions after business hours, you may reach the on-call GI Fellow by contacting the [facility]  at [phone].     How will I get the results of any tests ordered?    You will receive all of your results.  If you have signed up for [employer], any tests ordered at your visit will be available to you after your physician reviews them.  Typically this takes 1-2 weeks.  If there are urgent results that require a change in your care plan, your physician or nurse will call you to discuss the next steps.      Thank you for choosing [facility]!       Sincerely,  Grover Day MD  [facility]  Division of Gastroenterology

## 2021-08-19 ENCOUNTER — TELEPHONE (OUTPATIENT)
Dept: GASTROENTEROLOGY | Facility: CLINIC | Age: 56
End: 2021-08-19

## 2021-08-19 NOTE — PROGRESS NOTES
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  After the first session appeared to be complete eradication of Dobson's.  Repeat exams in 2019 and 2020 with biopsies did not reveal any Dobson's esophagus.  The patient had his last upper endoscopy with biopsies June 2020.  The patient has a sore throat feels like it hurts around his voice box affecting his voice has a history of some vocal dysfunction in the past and has seen ENT.      Recommendations:   EGD with biopsies  Recommendations for future surveillance to follow based on those results  ENT consultation for sore throat and vocal dysfunction present (the patient will see his own ENT physician as he has seen them in the past).    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  After the first session appeared to be complete eradication of Dobson's.  Repeat exams in 2019 and 2020 with biopsies did not reveal any Dobson's esophagus.  The patient had his last upper endoscopy with biopsies June 2020.    Vis-à-vis acid reflux symptoms he is doing okay he does have a pain up in his throat that affect his voice.  He has had this in the past and has been evaluated by an ear nose and throat doctor.  It is never really been clear what this is and whether or not it is related to acid reflux.  Patient does maintain is omeprazole 40 mg p.o. twice daily as a regimen.  He was treated in 2019 for chronic lymphocytic leukemia with advanced changes including bulky lymphadenopathy.  He received biologic therapy and  chemotherapy.  This was given to him at [facility] in [place].  He currently denies lymphadenopathy, losing weight, fevers, chills, or night sweats or other sweats, fatigue, shortness of breath.  He denies dysphagia.    EGD with ablation  7/2/2018  Impression:          - C0M1 Barretts esophagus (biopsy proven with low grade                        dysplasia) without concern concomitant lesion was                        treated with radiofrequncy ablation as detailed above                        - Otherwise unremarkable foregut endoscopy   Recommendation:      - Standard outpatient general anesthesia recovery with                        probable discharge home this afternoon                        - Repeat upper endoscopy with repeat therapy or sampling                        (pending findings) in 12 weeks                        - Continue acid suppression and behavior modification as                        discussed in clinic                        - The findings and recommendations were discussed with                        the patient and their family                                                                                     EGD with ablation 12/17/2018  Impression:          - Apparent complete endoscopic response with only an                        irregular esophagogastric line demonstrated and                        retreated with radiofrequency ablation as above                        - Otherwise unremarkable foregut endoscopy   Recommendation:      - Standard outpatient deep sedation recovery with                        probable discharge home this morning                        - Continue reflux precautions and medications (PPI) as                        well as other lifestyle modifications                        - Liquids for the next 36h followed by slow progression                        to small meal diet                        - Chest pain expected over the next 24h; this should not                         be progressive or associated with shortness of breath;                        we will prescribe viscous lidocaine to be taken PRN                        - Repeat EGD in 3m with plan for resampling                        - The findings and recommendations were discussed with                        the patient and their family                                                                                     EGD 6/25/2019  Impression:          - Endoscopic evidence to suggest resolved Barretts                        esophagus without inflammation or lesion, now sampled                        for pathology verification   Recommendation:      - Deep sedation recovery with probable discharge home                        this morning; diet to resume as usual as soon as                        anethesia resolves                        - Continue once daily PPI for the time being                        - Timing of surveillance EGD to be determined by                        pathology (residual dysplasia in three months for                        retreatment vs Barretts without dysplasia nor no                        Barretts in one year)                        - The findings and recommendations were discussed with                        the patient and their family   Path 6/25/2019  A: Gastroesophageal junction biopsy. 40cm   B: Gastroesophageal junction biopsy. 39cm     FINAL DIAGNOSIS:   A. GASTROESOPHAGEAL JUNCTION, 40CM, BIOPSY:   - Squamous and gastric type epithelium with mild chronic inflammation   - Negative for intestinal metaplasia or dysplasia       EGD 6/29/2020                                                                                Impression:               - Grossly unremarkable white light evaluation of                             the foregut without persistent endoscopic evidence                             of Barretts; surveillance biopsies pending   Recommendation:           - Deep  sedation recovery with probable discharge                             home this afternoon                             - We will refer to one of our esophagologists for                             follow up and ongoing surveillance (Gregorio Guy,                             or Shay)                             - Dr Avery to communicate the results of                             histology when available; if biopsies again return                             without Barretts a decision for repeat evaluation                             in 3y or by clinical course will be made                             - The findings and recommendations were discussed                             with the patient and their family   Path 6/29/2020  SPECIMEN(S):   Gastroesophageal junction biopsy     FINAL DIAGNOSIS:   Gastroesophageal junction: Biopsy:   - Squamocolumnar and columnar mucosa with chronic inflammation and   reactive epithelial changes   - No intestinal metaplasia or dysplasia     Current Outpatient Medications   Medication Sig Dispense Refill     acyclovir (ZOVIRAX) 400 MG tablet daily        esomeprazole (NEXIUM) 40 MG DR capsule Take 40 mg by mouth 2 times daily (before meals) Take 30-60 minutes before eating.       FLUoxetine (PROZAC) 20 MG capsule Take 40 mg by mouth daily        guaiFENesin (MUCINEX) 600 MG 12 hr tablet Take 600 mg by mouth 2 times daily       Ibuprofen (ADVIL PO) Take by mouth every 4 hours as needed for moderate pain       Multiple Vitamin (MULTI-VITAMINS) TABS        OMEGA-3 FATTY ACIDS-VITAMIN E PO Take 1 capsule by mouth daily        Turmeric 400 MG CAPS Take 400 mg by mouth daily         Past Surgical History:   Procedure Laterality Date     ESOPHAGOSCOPY, GASTROSCOPY, DUODENOSCOPY (EGD) WITH RADIO FREQUENCY ABLATION, COMBINED N/A 7/2/2018    Procedure: COMBINED ESOPHAGOSCOPY, GASTROSCOPY, DUODENOSCOPY (EGD) WITH RADIO FREQUENCY ABLATION;  Esophagogastroduodenoscopy With Radio Frequency  Ablation ;  Surgeon: Abilio Avery MD;  Location: [facility] OR     ESOPHAGOSCOPY, GASTROSCOPY, DUODENOSCOPY (EGD) WITH RADIO FREQUENCY ABLATION, COMBINED N/A 12/17/2018    Procedure: Upper Endoscopy;  Surgeon: Abilio Avery MD;  Location: [facility] OR     ESOPHAGOSCOPY, GASTROSCOPY, DUODENOSCOPY (EGD), COMBINED N/A 6/24/2019    Procedure: Esophagogastroduodenoscopy, Upper Endoscopy with Biopsy;  Surgeon: Abilio Avery MD;  Location: [facility] OR     ESOPHAGOSCOPY, GASTROSCOPY, DUODENOSCOPY (EGD), COMBINED N/A 6/29/2020    Procedure: ESOPHAGOGASTRODUODENOSCOPY (EGD), with biopsy;  Surgeon: Abilio Avery MD;  Location: [facility] OR     HERNIA REPAIR         Past Medical History:   Diagnosis Date     Anaplasmosis      Anxiety      Aseptic meningitis      Cervical disc herniation      CLL (chronic lymphocytic leukemia) (H)      Colitis      Esophageal reflux      Persistent lymphocytosis      Terminal ileitis (H)        No family history on file.     reports that he quit smoking about 20 years ago. He has quit using smokeless tobacco. He reports current alcohol use.    Physical Exam  Constitutional:       Appearance: Normal appearance.   HENT:      Head: Normocephalic.   Eyes:      Extraocular Movements: Extraocular movements intact.   Pulmonary:      Effort: Pulmonary effort is normal.   Abdominal:      General: Abdomen is flat.   Musculoskeletal:         General: Normal range of motion.   Skin:     General: Skin is warm and dry.   Neurological:      Mental Status: He is alert.

## 2021-08-19 NOTE — TELEPHONE ENCOUNTER
lvm to schedule 6 week follow up appt with Dr. Grover Day. Left call center phone number and sent [employer].   [phone]

## 2021-08-20 ENCOUNTER — TELEPHONE (OUTPATIENT)
Dept: GASTROENTEROLOGY | Facility: OUTPATIENT CENTER | Age: 56
End: 2021-08-20

## 2021-08-20 NOTE — TELEPHONE ENCOUNTER
Screening Questions  1. Are you active on mychart? yes    2. What insurance is in the chart? HP    2.  Ordering/Referring Provider: Grover Day    3. BMI 32.1    4. Are you on daily home oxygen? no    5. Do you have a history of difficult airway? no    6. Have you had a heart, lung, or liver transplant? no    7. Are you currently on dialysis? no    8. Have you had a stroke or Transient ischemic atttack (TIA) within 6 months? no    9. In the past 6 months, have you had any heart related issues including cardiomyopathy or heart attack?         If yes, did it require cardiac stenting or other implantable device?no    10. Do you have any implantable devices in your body (pacemaker, defib, LVAD)? no    11. Do you take nitroglycerin? If yes, how often? no    12. Are you currently taking any blood thinners?no    13. Are you a diabetic? no    14. (Females) Are you currently pregnant?   If yes, how many weeks?    15. Have you had a procedure in the past that was difficult to tolerate with conscious sedation? Any allergies to Fentanyl or Versed no    16. Are you taking any scheduled prescription narcotics more than once daily? no    17. Do you have any chemical dependencies such as alcohol, street drugs, or methadone? no    18. Do you have any history of post-traumatic stress syndrome or mental health issues? no    19. Do you transfer independently? yes    20.  Do you have any issues with constipation? no    21. Preferred Pharmacy for Pre Prescription [facility] pharmacy in [place]    Scheduling Details    Which Colonoscopy Prep was Sent?: miralax  Procedure Scheduled: colonoscopy  Provider/Surgeon: felisha day  Date of Procedure: 09/16/21  Location: [facility]   Caller (Please ask for phone number if not scheduled by patient): priya cole      Sedation Type: cs  Conscious Sedation- Needs  for 6 hours after the procedure  MAC/General-Needs  for 24 hours after procedure    Pre-op Required at [facility] [place], [place]  and OR for MAC sedation:   (if yes advise patient they will need a pre-op prior to procedure)      Is patient on blood thinners? -no (If yes- inform patient to follow up with PCP or provider for follow up instructions)     Informed patient they will need an adult  yes  Cannot take any type of public or medical transportation alone    Informed Patient of COVID Test Requirement yes    Confirmed Nurse will call to complete assessment yes    Additional comments:

## 2021-08-21 DIAGNOSIS — Z11.59 ENCOUNTER FOR SCREENING FOR OTHER VIRAL DISEASES: ICD-10-CM

## 2021-09-01 ENCOUNTER — TELEPHONE (OUTPATIENT)
Dept: GASTROENTEROLOGY | Facility: CLINIC | Age: 56
End: 2021-09-01

## 2021-09-13 ENCOUNTER — LAB (OUTPATIENT)
Dept: URGENT CARE | Facility: URGENT CARE | Age: 56
End: 2021-09-13
Payer: COMMERCIAL

## 2021-09-13 DIAGNOSIS — Z11.59 ENCOUNTER FOR SCREENING FOR OTHER VIRAL DISEASES: ICD-10-CM

## 2021-09-13 PROCEDURE — U0005 INFEC AGEN DETEC AMPLI PROBE: HCPCS

## 2021-09-13 PROCEDURE — U0003 INFECTIOUS AGENT DETECTION BY NUCLEIC ACID (DNA OR RNA); SEVERE ACUTE RESPIRATORY SYNDROME CORONAVIRUS 2 (SARS-COV-2) (CORONAVIRUS DISEASE [COVID-19]), AMPLIFIED PROBE TECHNIQUE, MAKING USE OF HIGH THROUGHPUT TECHNOLOGIES AS DESCRIBED BY CMS-2020-01-R: HCPCS

## 2021-09-14 LAB — SARS-COV-2 RNA RESP QL NAA+PROBE: NEGATIVE

## 2021-09-16 ENCOUNTER — HOSPITAL ENCOUNTER (OUTPATIENT)
Facility: CLINIC | Age: 56
Discharge: HOME | End: 2021-09-16
Attending: INTERNAL MEDICINE | Admitting: INTERNAL MEDICINE
Payer: COMMERCIAL

## 2021-09-16 VITALS
OXYGEN SATURATION: 97 % | RESPIRATION RATE: 10 BRPM | BODY MASS INDEX: 32.6 KG/M2 | DIASTOLIC BLOOD PRESSURE: 84 MMHG | HEIGHT: 74 IN | SYSTOLIC BLOOD PRESSURE: 127 MMHG | HEART RATE: 53 BPM | WEIGHT: 254 LBS

## 2021-09-16 LAB — UPPER GI ENDOSCOPY: NORMAL

## 2021-09-16 PROCEDURE — 43239 EGD BIOPSY SINGLE/MULTIPLE: CPT | Performed by: INTERNAL MEDICINE

## 2021-09-16 PROCEDURE — 250N000011 HC RX IP 250 OP 636: Performed by: INTERNAL MEDICINE

## 2021-09-16 PROCEDURE — 88305 TISSUE EXAM BY PATHOLOGIST: CPT | Mod: TC | Performed by: INTERNAL MEDICINE

## 2021-09-16 PROCEDURE — G0500 MOD SEDAT ENDO SERVICE >5YRS: HCPCS | Performed by: INTERNAL MEDICINE

## 2021-09-16 RX ORDER — ONDANSETRON 2 MG/ML
4 INJECTION INTRAMUSCULAR; INTRAVENOUS
Status: CANCELLED | OUTPATIENT
Start: 2021-09-16

## 2021-09-16 RX ORDER — FENTANYL CITRATE 50 UG/ML
INJECTION, SOLUTION INTRAMUSCULAR; INTRAVENOUS PRN
Status: COMPLETED | OUTPATIENT
Start: 2021-09-16 | End: 2021-09-16

## 2021-09-16 RX ORDER — LIDOCAINE 40 MG/G
CREAM TOPICAL
Status: CANCELLED | OUTPATIENT
Start: 2021-09-16

## 2021-09-16 RX ADMIN — MIDAZOLAM 1 MG: 1 INJECTION INTRAMUSCULAR; INTRAVENOUS at 13:48

## 2021-09-16 RX ADMIN — FENTANYL CITRATE 25 MCG: 50 INJECTION, SOLUTION INTRAMUSCULAR; INTRAVENOUS at 13:48

## 2021-09-16 RX ADMIN — FENTANYL CITRATE 100 MCG: 50 INJECTION, SOLUTION INTRAMUSCULAR; INTRAVENOUS at 13:43

## 2021-09-16 RX ADMIN — MIDAZOLAM 2 MG: 1 INJECTION INTRAMUSCULAR; INTRAVENOUS at 13:43

## 2021-09-16 RX ADMIN — MIDAZOLAM 1 MG: 1 INJECTION INTRAMUSCULAR; INTRAVENOUS at 13:53

## 2021-09-16 ASSESSMENT — MIFFLIN-ST. JEOR: SCORE: 2066.89

## 2021-09-17 LAB
PATH REPORT.COMMENTS IMP SPEC: NORMAL
PATH REPORT.COMMENTS IMP SPEC: NORMAL
PATH REPORT.FINAL DX SPEC: NORMAL
PATH REPORT.GROSS SPEC: NORMAL
PATH REPORT.MICROSCOPIC SPEC OTHER STN: NORMAL
PHOTO IMAGE: NORMAL

## 2021-09-17 PROCEDURE — 88305 TISSUE EXAM BY PATHOLOGIST: CPT | Mod: 26 | Performed by: PATHOLOGY

## 2021-09-19 ENCOUNTER — HEALTH MAINTENANCE LETTER (OUTPATIENT)
Age: 56
End: 2021-09-19

## 2021-09-21 DIAGNOSIS — K22.710 BARRETT'S ESOPHAGUS WITH LOW GRADE DYSPLASIA: Primary | ICD-10-CM

## 2021-09-27 ENCOUNTER — VIRTUAL VISIT (OUTPATIENT)
Dept: GASTROENTEROLOGY | Facility: CLINIC | Age: 56
End: 2021-09-27
Payer: COMMERCIAL

## 2021-09-27 VITALS — WEIGHT: 249 LBS | HEIGHT: 74 IN | BODY MASS INDEX: 31.95 KG/M2

## 2021-09-27 DIAGNOSIS — K22.710 BARRETT'S ESOPHAGUS WITH LOW GRADE DYSPLASIA: Primary | ICD-10-CM

## 2021-09-27 PROCEDURE — 99213 OFFICE O/P EST LOW 20 MIN: CPT | Mod: GT | Performed by: INTERNAL MEDICINE

## 2021-09-27 ASSESSMENT — MIFFLIN-ST. JEOR: SCORE: 2044.21

## 2021-09-27 ASSESSMENT — PAIN SCALES - GENERAL: PAINLEVEL: NO PAIN (0)

## 2021-09-30 NOTE — PATIENT INSTRUCTIONS
Recommendations:  Follow-up upper endoscopy in 1 year (ordered)  Continue Nexium 40 mg p.o. twice daily

## 2021-09-30 NOTE — PROGRESS NOTES
"Gastroenterology Progress Note  [facility]      Solitario Lucia is a 55 year old male who is being evaluated via a billable video visit.      The patient has been notified of following:     \"This video visit will be conducted via a call between you and your physician/provider. We have found that certain health care needs can be provided without the need for an in-person physical exam.  This service lets us provide the care you need with a video conversation.  If a prescription is necessary we can send it directly to your pharmacy.  If lab work is needed we can place an order for that and you can then stop by our lab to have the test done at a later time.    If during the course of the call the physician/provider feels a video visit is not appropriate, you will not be charged for this service.\"     Patient confirmed that they are in [place] for today's visit Yes    Video-Visit Details  Type of service:  Video Visit    Video Start Time: 11:02 AM  Video End Time:  11:10    Originating Location (pt. Location): [place]    Distant Location (provider location):  [facility]     Platform used: Accellos                 Reason for Follow Up:     Dobson's w low grade dysplasia sp ablation follow up.             ASSESSMENT AND RECOMMENDATIONS:   Assessment:  55 year old male with a history of short segment Dobson's esophagus status post ablation after the discovery of low-grade dysplasia by Dr. Abilio Avery in  2018.  Follow-up endoscopies not demonstrated recurrence of Dobson's or any further dysplasia.        Recommendations:  Follow-up upper endoscopy in 1 year (ordered)  Continue Nexium 40 mg p.o. twice daily                 History of Present Illness:   Solitario Lucia is a 55 year old male with a history of Dobson's esophagus with low-grade dysplasia status post radiofrequency ablation in 2018 follow-up endoscopies over the last few years have not shown any recurrence of Dobson's or any " dysplasia.  He underwent upper endoscopy 2 weeks ago which again did not identify any Dobson's esophagus or any dysplasia.  The patient returns today to discuss follow-up.  He is close to being 5 years out I think 1 more time having a yearly endoscopy he will be able to be spaced out to 3 years.    He denies dysphagia heartburn difficulty swallowing.  He takes Nexium 40 mg p.o. twice daily and is maintaining that.  He clinically feels he does not need additional acid blocking medication on top of that.    EGD 9/16/2021    Impression:               - Normal esophagus. Biopsied.                             - Esophagogastric landmarks identified.                             - Normal stomach.                             - Normal examined duodenum.   Recommendation:           - Await pathology results.                             - Repeat upper endoscopy in 1 year for surveillance.                             - Return to GI office as previously scheduled.     Pathology 9/16/2021  Final Diagnosis   Esophagus, 38-39 cm, biopsy-  Nonneoplastic gastric and esophageal mucosa, no evidence of Dobson's esophagus, dysplasia or malignancy          Past Medical and Surgical History, Social History, Family History - per Epic EMR: REVIEWED         Allergies:   Reviewed and edited as appropriate     Allergies   Allergen Reactions     Other [No Clinical Screening - See Comments]      Sunscreen, scented rash            Medications:     Current Outpatient Medications   Medication Sig Dispense Refill     acyclovir (ZOVIRAX) 400 MG tablet daily        esomeprazole (NEXIUM) 40 MG DR capsule Take 40 mg by mouth 2 times daily (before meals) Take 30-60 minutes before eating.       guaiFENesin (MUCINEX) 600 MG 12 hr tablet Take 600 mg by mouth 2 times daily       Ibuprofen (ADVIL PO) Take by mouth every 4 hours as needed for moderate pain       Multiple Vitamin (MULTI-VITAMINS) TABS        OMEGA-3 FATTY ACIDS-VITAMIN E PO Take 1 capsule by  "mouth daily        Turmeric 400 MG CAPS Take 400 mg by mouth daily       FLUoxetine (PROZAC) 20 MG capsule Take 40 mg by mouth daily  (Patient not taking: Reported on 9/27/2021)               Review of Systems:     Per HPI           Physical Exam:   Ht 1.88 m (6' 2\")   Wt 112.9 kg (249 lb)   BMI 31.97 kg/m    Wt:   Wt Readings from Last 2 Encounters:   09/27/21 112.9 kg (249 lb)   09/16/21 115.2 kg (254 lb)      Constitutional: cooperative, pleasant, not dyspneic/diaphoretic, no acute distress  Videovisit         Grover Day MD  Associate Professor of Medicine  Division of Gastroenterology, Hepatology, and Nutrition  [facility]    "

## 2021-10-12 ENCOUNTER — TELEPHONE (OUTPATIENT)
Dept: GASTROENTEROLOGY | Facility: CLINIC | Age: 56
End: 2021-10-12

## 2021-10-12 NOTE — TELEPHONE ENCOUNTER
Called Keo to assess jaw pain. He reports he is still having it ever since his EGD almost 1 month ago. He reports it feels possibly muscular--he only notices it when he is chewing or yawning. He denies other symptoms at this time or a history of TMJ.     Advised he go in to see his primary provider. He questioned if it was from now being sprayed with lidocaine-like spray or if it had damaged his teeth. He then reports he will be going to his dentist in a few weeks for a dental cleaning anyway, and will have them look to see if it damaged any teeth.     Keo agrees to contact writer once he sees his primary provider. Will notify provider.

## 2022-06-26 ENCOUNTER — HEALTH MAINTENANCE LETTER (OUTPATIENT)
Age: 57
End: 2022-06-26

## 2022-06-27 ENCOUNTER — TELEPHONE (OUTPATIENT)
Dept: GASTROENTEROLOGY | Facility: CLINIC | Age: 57
End: 2022-06-27

## 2022-06-27 NOTE — TELEPHONE ENCOUNTER
Screening Questions    BlueKIND OF PREP RedLOCATION [review exclusion criteria] GreenSEDATION TYPE    1. Have you had a positive covid test in the last 90 days? N  a. If yes, what date?     2. Do you have a legal guardian or medical Power of ?  Are you able to give consent for your medical care?Y (Sedation review/consideration needed)    3. Are you active on mychart? Y    4. What insurance is in the chart? HP     3.   Ordering/Referring Provider: Grover Day    4. BMI 32.1 [BMI OVER 40-EXTENDED PREP]  If greater than 40 review exclusion criteria [PAC APPT IF (MAC) @ [facility]]      5.  Respiratory Screening:  [If yes to any of the following HOSPITAL setting only]     Do you use daily home oxygen? N    Do you have mod to severe Obstructive Sleep Apnea? N  [OKAY @ [facility] SH PH RI]   Do you have Pulmonary Hypertension? N     Do you have UNCONTROLLED asthma? N        6.   Have you had a heart or lung transplant? N      7.   Are you currently on dialysis? N [ If yes, G-PREP & HOSPITAL setting only]     8.   Do you have chronic kidney disease? N [ If yes, G-PREP ]    9.   Have you had a stroke or Transient ischemic attack (TIA - aka  mini stroke ) within 6 months?  N (If yes, please review exclusion criteria)         In the past 6 months, have you had any heart related issues including cardiomyopathy or heart attack? N           If yes, did it require cardiac stenting or other implantable device? N      10   Do you have any implantable devices in your body (pacemaker, defib, LVAD)? N (If yes, please review exclusion criteria)    11.   Do you take nitroglycerin? N            If yes, how often? N  (if yes, HOSPITAL setting ONLY)    12.   Are you currently taking any blood thinners? N           [IF YES, INFORM PATIENT TO FOLLOW UP W/ ORDERING PROVIDER FOR BRIDGING INSTRUCTIONS]     13.   Do you have a diagnosis of diabetes? N   [ If yes, G-PREP ]    14.   [FEMALES] Are you currently pregnant?     If yes, how  many weeks?     15.   Are you taking any prescription pain medications on a routine schedule?  N  [ If yes, EXTENDED PREP.] [If yes, MAC]    16.   Do you have any chemical dependencies such as alcohol, street drugs, or methadone?  N [If yes, MAC]    17.   Do you have any history of post-traumatic stress syndrome, severe anxiety or history of psychosis?  N  [If yes, MAC]    18.   Do you transfer independently? (If NO, please HOSPITAL setting  only)  Y    19.  On a regular basis do you go 3-5 days between bowel movements?    [ If yes, EXTENDED PREP.]    20.   Preferred LOCAL Pharmacy for Pre Prescription:      [id number] - [place], [place] - [address]    Scheduling Details      Caller: Solitario Lucia  (Please ask for phone number if not scheduled by patient)    Type of Procedure Scheduled: Upper Endoscopy [EGD]    Which Colonoscopy Prep was Sent?:   WES CF PATIENTS & GROEN'S PATIENTS NEEDS EXTENDED PREP    Surgeon: MELO  Date of Procedure: 9/26  Location:     Sedation Type: CS    Conscious Sedation- Needs  for 6 hours after the procedure  MAC/General-Needs  for 24 hours after procedure    Pre-op Required at [facility], [place], [place] and OR for MAC sedation: N  (advise patient they will need a pre-op WITH IN 30 DAYS prior to procedure -)      Informed patient they will need an adult  Y  Cannot take any type of public or medical transportation alone    Pre-Procedure Covid test to be completed at [facility] or Externally: HOME    Confirmed Nurse will call to complete assessment Y    Additional comments: Y

## 2022-09-19 ENCOUNTER — TELEPHONE (OUTPATIENT)
Dept: GASTROENTEROLOGY | Facility: CLINIC | Age: 57
End: 2022-09-19

## 2022-09-19 NOTE — TELEPHONE ENCOUNTER
Pre assessment questions completed for upcoming EGD procedure scheduled on 9/26/22    COVID policy reviewed. Patient to complete rapid antigen test one to two days before their scheduled procedure. Patient to bring photo of the results when they come in for their procedure.    Reviewed procedural arrival time 1215 and facility location .    Designated  policy reviewed. Instructed to have someone stay 6 hours post procedure.     Procedure indication: moctezuma's     Reviewed EGD prep instructions. NPO six hours prior to procedure.     Anticoagulation/blood thinners? no    Patient verbalized understanding and had no questions or concerns at this time.    Adriana Toscano RN

## 2022-09-26 ENCOUNTER — HOSPITAL ENCOUNTER (OUTPATIENT)
Facility: CLINIC | Age: 57
Discharge: HOME | End: 2022-09-26
Attending: INTERNAL MEDICINE | Admitting: INTERNAL MEDICINE
Payer: COMMERCIAL

## 2022-09-26 VITALS
DIASTOLIC BLOOD PRESSURE: 76 MMHG | HEART RATE: 57 BPM | BODY MASS INDEX: 31.44 KG/M2 | SYSTOLIC BLOOD PRESSURE: 137 MMHG | RESPIRATION RATE: 22 BRPM | HEIGHT: 74 IN | WEIGHT: 245 LBS | OXYGEN SATURATION: 93 %

## 2022-09-26 LAB — UPPER GI ENDOSCOPY: NORMAL

## 2022-09-26 PROCEDURE — 88305 TISSUE EXAM BY PATHOLOGIST: CPT | Mod: 26

## 2022-09-26 PROCEDURE — G0500 MOD SEDAT ENDO SERVICE >5YRS: HCPCS | Performed by: INTERNAL MEDICINE

## 2022-09-26 PROCEDURE — 999N000099 HC STATISTIC MODERATE SEDATION < 10 MIN: Performed by: INTERNAL MEDICINE

## 2022-09-26 PROCEDURE — 88305 TISSUE EXAM BY PATHOLOGIST: CPT | Mod: TC | Performed by: INTERNAL MEDICINE

## 2022-09-26 PROCEDURE — 88312 SPECIAL STAINS GROUP 1: CPT | Mod: 26

## 2022-09-26 PROCEDURE — 250N000011 HC RX IP 250 OP 636: Performed by: INTERNAL MEDICINE

## 2022-09-26 PROCEDURE — 43239 EGD BIOPSY SINGLE/MULTIPLE: CPT | Performed by: INTERNAL MEDICINE

## 2022-09-26 RX ORDER — ONDANSETRON 2 MG/ML
4 INJECTION INTRAMUSCULAR; INTRAVENOUS
Status: CANCELLED | OUTPATIENT
Start: 2022-09-26

## 2022-09-26 RX ORDER — LIDOCAINE 40 MG/G
CREAM TOPICAL
Status: CANCELLED | OUTPATIENT
Start: 2022-09-26

## 2022-09-26 RX ORDER — FENTANYL CITRATE 50 UG/ML
INJECTION, SOLUTION INTRAMUSCULAR; INTRAVENOUS PRN
Status: COMPLETED | OUTPATIENT
Start: 2022-09-26 | End: 2022-09-26

## 2022-09-26 RX ADMIN — FENTANYL CITRATE 25 MCG: 50 INJECTION, SOLUTION INTRAMUSCULAR; INTRAVENOUS at 13:21

## 2022-09-26 RX ADMIN — MIDAZOLAM 2 MG: 1 INJECTION INTRAMUSCULAR; INTRAVENOUS at 13:21

## 2022-09-26 RX ADMIN — FENTANYL CITRATE 100 MCG: 50 INJECTION, SOLUTION INTRAMUSCULAR; INTRAVENOUS at 13:20

## 2022-09-26 RX ADMIN — MIDAZOLAM 2 MG: 1 INJECTION INTRAMUSCULAR; INTRAVENOUS at 13:20

## 2022-09-26 ASSESSMENT — ACTIVITIES OF DAILY LIVING (ADL): ADLS_ACUITY_SCORE: 35

## 2022-09-26 NOTE — H&P
Solitario Lucia  [id number]  male  56 year old      Reason for procedure/surgery: EGD for Dobson's Surveillance    Patient Active Problem List   Diagnosis     Advance Care Planning     Dobson's esophagus       Past Surgical History:    Past Surgical History:   Procedure Laterality Date     ESOPHAGOSCOPY, GASTROSCOPY, DUODENOSCOPY (EGD) WITH RADIO FREQUENCY ABLATION, COMBINED N/A 7/2/2018    Procedure: COMBINED ESOPHAGOSCOPY, GASTROSCOPY, DUODENOSCOPY (EGD) WITH RADIO FREQUENCY ABLATION;  Esophagogastroduodenoscopy With Radio Frequency Ablation ;  Surgeon: Abilio Avery MD;  Location:  OR     ESOPHAGOSCOPY, GASTROSCOPY, DUODENOSCOPY (EGD) WITH RADIO FREQUENCY ABLATION, COMBINED N/A 12/17/2018    Procedure: Upper Endoscopy;  Surgeon: Abilio Avery MD;  Location:  OR     ESOPHAGOSCOPY, GASTROSCOPY, DUODENOSCOPY (EGD), COMBINED N/A 6/24/2019    Procedure: Esophagogastroduodenoscopy, Upper Endoscopy with Biopsy;  Surgeon: Abilio Avery MD;  Location:  OR     ESOPHAGOSCOPY, GASTROSCOPY, DUODENOSCOPY (EGD), COMBINED N/A 6/29/2020    Procedure: ESOPHAGOGASTRODUODENOSCOPY (EGD), with biopsy;  Surgeon: Abilio Avery MD;  Location:  OR     ESOPHAGOSCOPY, GASTROSCOPY, DUODENOSCOPY (EGD), COMBINED N/A 9/16/2021    Procedure: ESOPHAGOGASTRODUODENOSCOPY, WITH BIOPSY;  Surgeon: Grover Day MD;  Location:  GI     HERNIA REPAIR       ORTHOPEDIC SURGERY      Partial Right knee replacement     ORTHOPEDIC SURGERY      R femur fx with marcela replacement       Past Medical History:   Past Medical History:   Diagnosis Date     Anaplasmosis      Anxiety      Aseptic meningitis      Dobson's esophagus      Cervical disc herniation      CLL (chronic lymphocytic leukemia) (H)     Leukemia     Colitis      Esophageal reflux      Persistent lymphocytosis      Terminal ileitis (H)        Social History:   Social History     Tobacco Use     Smoking status: Former Smoker     Quit date: 1/1/2001      Years since quittin.7     Smokeless tobacco: Former User   Substance Use Topics     Alcohol use: Yes     Comment: once in awhile       Family History: History reviewed. No pertinent family history.    Allergies:   Allergies   Allergen Reactions     Other [No Clinical Screening - See Comments]      Sunscreen, scented rash       Active Medications:   No current outpatient medications on file.       Systemic Review:   CONSTITUTIONAL: NEGATIVE for fever, chills, change in weight  ENT/MOUTH: NEGATIVE for ear, mouth and throat problems  RESP: NEGATIVE for significant cough or SOB  CV: NEGATIVE for chest pain, palpitations or peripheral edema    Physical Examination:   Vital Signs: There were no vitals taken for this visit.  GENERAL: healthy, alert and no distress  NECK: no adenopathy, no asymmetry, masses, or scars  RESP: lungs clear to auscultation - no rales, rhonchi or wheezes  CV: regular rate and rhythm, normal S1 S2, no S3 or S4, no murmur, click or rub, no peripheral edema and peripheral pulses strong  ABDOMEN: soft, nontender, no hepatosplenomegaly, no masses and bowel sounds normal  MS: no gross musculoskeletal defects noted, no edema    ASA: 2    Mallampati Score: 2    Plan: Appropriate to proceed as scheduled.      Grover Day MD  2022    PCP:  Grover Grijalva

## 2022-09-30 LAB
PATH REPORT.COMMENTS IMP SPEC: NORMAL
PATH REPORT.FINAL DX SPEC: NORMAL
PATH REPORT.GROSS SPEC: NORMAL
PATH REPORT.MICROSCOPIC SPEC OTHER STN: NORMAL
PATH REPORT.RELEVANT HX SPEC: NORMAL
PHOTO IMAGE: NORMAL

## 2022-11-20 ENCOUNTER — HEALTH MAINTENANCE LETTER (OUTPATIENT)
Age: 57
End: 2022-11-20

## 2023-07-08 ENCOUNTER — HEALTH MAINTENANCE LETTER (OUTPATIENT)
Age: 58
End: 2023-07-08

## 2023-07-11 ENCOUNTER — TELEPHONE (OUTPATIENT)
Dept: GASTROENTEROLOGY | Facility: CLINIC | Age: 58
End: 2023-07-11
Payer: COMMERCIAL

## 2023-07-11 NOTE — TELEPHONE ENCOUNTER
Spoke to Keo who requested a follow up visit. Reports would like to discuss a colonoscopy referral and follow up for Dobson's.     Reviewed with Daphne Almonte PA-C who states can see this patient.     Accepted appointment on 7-20-21 12pm Virtual.     Routed to Scheduling Navigator to complete visit scheduling.

## 2024-06-17 PROBLEM — Z71.89 ADVANCED DIRECTIVES, COUNSELING/DISCUSSION: Status: RESOLVED | Noted: 2018-07-10 | Resolved: 2024-06-17

## 2024-08-31 ENCOUNTER — HEALTH MAINTENANCE LETTER (OUTPATIENT)
Age: 59
End: 2024-08-31

## (undated) DEVICE — SUCTION MANIFOLD DORNOCH ULTRA CART UL-CL500

## (undated) DEVICE — PAD CHUX UNDERPAD 23X24" 7136

## (undated) DEVICE — KIT ENDO FIRST STEP DISINFECTANT 200ML W/POUCH EP-4

## (undated) DEVICE — SOL WATER IRRIG 1000ML BOTTLE 2F7114

## (undated) DEVICE — TAPE CLOTH 3" CARDINAL 3TRCL03

## (undated) DEVICE — KIT CONNECTOR FOR OLYMPUS ENDOSCOPES DEFENDO 100310

## (undated) DEVICE — ENDO BITE BLOCK ADULT OMNI-BLOC

## (undated) DEVICE — PACK ENDOSCOPY GI CUSTOM UMMC

## (undated) DEVICE — ENDO CAP AND TUBING STERILE FOR ENDOGATOR  100130

## (undated) DEVICE — ENDO TUBING CO2 SMARTCAP STERILE DISP 100145CO2EXT

## (undated) DEVICE — CAP HALO MED BARRX

## (undated) DEVICE — ENDO DEVICE LOCKING AND BIOPSY CAP M00545261

## (undated) DEVICE — ESU CATH ABLATION 2.8MM 7.5X15.7MM ELEC BARRX TTS-1100

## (undated) DEVICE — ENDO FORCEP BX CAPTURA LG SPIKE 2.4MMX230CM G53641

## (undated) RX ORDER — PROPOFOL 10 MG/ML
INJECTION, EMULSION INTRAVENOUS
Status: DISPENSED
Start: 2018-12-17

## (undated) RX ORDER — IOPAMIDOL 510 MG/ML
INJECTION, SOLUTION INTRAVASCULAR
Status: DISPENSED
Start: 2019-06-24

## (undated) RX ORDER — LIDOCAINE HYDROCHLORIDE 20 MG/ML
INJECTION, SOLUTION EPIDURAL; INFILTRATION; INTRACAUDAL; PERINEURAL
Status: DISPENSED
Start: 2018-12-17

## (undated) RX ORDER — PROPOFOL 10 MG/ML
INJECTION, EMULSION INTRAVENOUS
Status: DISPENSED
Start: 2019-06-24

## (undated) RX ORDER — PROPOFOL 10 MG/ML
INJECTION, EMULSION INTRAVENOUS
Status: DISPENSED
Start: 2018-07-02

## (undated) RX ORDER — PROPOFOL 10 MG/ML
INJECTION, EMULSION INTRAVENOUS
Status: DISPENSED
Start: 2020-06-29

## (undated) RX ORDER — FENTANYL CITRATE 50 UG/ML
INJECTION, SOLUTION INTRAMUSCULAR; INTRAVENOUS
Status: DISPENSED
Start: 2022-09-26

## (undated) RX ORDER — FENTANYL CITRATE 50 UG/ML
INJECTION, SOLUTION INTRAMUSCULAR; INTRAVENOUS
Status: DISPENSED
Start: 2021-09-16

## (undated) RX ORDER — FENTANYL CITRATE 50 UG/ML
INJECTION, SOLUTION INTRAMUSCULAR; INTRAVENOUS
Status: DISPENSED
Start: 2020-06-29

## (undated) RX ORDER — INDOMETHACIN 50 MG/1
SUPPOSITORY RECTAL
Status: DISPENSED
Start: 2019-06-24

## (undated) RX ORDER — ONDANSETRON 2 MG/ML
INJECTION INTRAMUSCULAR; INTRAVENOUS
Status: DISPENSED
Start: 2018-07-02

## (undated) RX ORDER — LIDOCAINE HYDROCHLORIDE 20 MG/ML
INJECTION, SOLUTION EPIDURAL; INFILTRATION; INTRACAUDAL; PERINEURAL
Status: DISPENSED
Start: 2019-06-24

## (undated) RX ORDER — FENTANYL CITRATE 50 UG/ML
INJECTION, SOLUTION INTRAMUSCULAR; INTRAVENOUS
Status: DISPENSED
Start: 2018-07-02

## (undated) RX ORDER — ONDANSETRON 4 MG/1
TABLET, ORALLY DISINTEGRATING ORAL
Status: DISPENSED
Start: 2018-12-17

## (undated) RX ORDER — SIMETHICONE 40MG/0.6ML
SUSPENSION, DROPS(FINAL DOSAGE FORM)(ML) ORAL
Status: DISPENSED
Start: 2019-06-24

## (undated) RX ORDER — FENTANYL CITRATE 50 UG/ML
INJECTION, SOLUTION INTRAMUSCULAR; INTRAVENOUS
Status: DISPENSED
Start: 2019-06-24

## (undated) RX ORDER — DEXAMETHASONE SODIUM PHOSPHATE 4 MG/ML
INJECTION, SOLUTION INTRA-ARTICULAR; INTRALESIONAL; INTRAMUSCULAR; INTRAVENOUS; SOFT TISSUE
Status: DISPENSED
Start: 2018-07-02

## (undated) RX ORDER — ONDANSETRON 2 MG/ML
INJECTION INTRAMUSCULAR; INTRAVENOUS
Status: DISPENSED
Start: 2018-12-17

## (undated) RX ORDER — GLYCOPYRROLATE 0.2 MG/ML
INJECTION, SOLUTION INTRAMUSCULAR; INTRAVENOUS
Status: DISPENSED
Start: 2018-07-02

## (undated) RX ORDER — HYDROMORPHONE HYDROCHLORIDE 1 MG/ML
INJECTION, SOLUTION INTRAMUSCULAR; INTRAVENOUS; SUBCUTANEOUS
Status: DISPENSED
Start: 2018-07-02

## (undated) RX ORDER — ONDANSETRON 2 MG/ML
INJECTION INTRAMUSCULAR; INTRAVENOUS
Status: DISPENSED
Start: 2019-06-24

## (undated) RX ORDER — DEXAMETHASONE SODIUM PHOSPHATE 4 MG/ML
INJECTION, SOLUTION INTRA-ARTICULAR; INTRALESIONAL; INTRAMUSCULAR; INTRAVENOUS; SOFT TISSUE
Status: DISPENSED
Start: 2019-06-24